# Patient Record
Sex: MALE | Race: WHITE | NOT HISPANIC OR LATINO | URBAN - METROPOLITAN AREA
[De-identification: names, ages, dates, MRNs, and addresses within clinical notes are randomized per-mention and may not be internally consistent; named-entity substitution may affect disease eponyms.]

---

## 2018-06-29 ENCOUNTER — OUTPATIENT (OUTPATIENT)
Dept: OUTPATIENT SERVICES | Facility: HOSPITAL | Age: 48
LOS: 1 days | Discharge: HOME | End: 2018-06-29
Payer: COMMERCIAL

## 2018-07-18 ENCOUNTER — INPATIENT (INPATIENT)
Facility: HOSPITAL | Age: 48
LOS: 8 days | Discharge: HOME | DRG: 675 | End: 2018-07-27
Attending: SPECIALIST | Admitting: SPECIALIST
Payer: COMMERCIAL

## 2018-07-18 VITALS
SYSTOLIC BLOOD PRESSURE: 122 MMHG | TEMPERATURE: 99 F | HEART RATE: 104 BPM | DIASTOLIC BLOOD PRESSURE: 82 MMHG | OXYGEN SATURATION: 98 % | RESPIRATION RATE: 18 BRPM | WEIGHT: 171.08 LBS

## 2018-07-18 DIAGNOSIS — T83.61XA INFECTION AND INFLAMMATORY REACTION DUE TO IMPLANTED PENILE PROSTHESIS, INITIAL ENCOUNTER: ICD-10-CM

## 2018-07-18 LAB
ALBUMIN SERPL ELPH-MCNC: 4.5 G/DL — SIGNIFICANT CHANGE UP (ref 3.3–5)
ALP SERPL-CCNC: 58 U/L — SIGNIFICANT CHANGE UP (ref 40–120)
ALT FLD-CCNC: 12 U/L — SIGNIFICANT CHANGE UP (ref 10–45)
ANION GAP SERPL CALC-SCNC: 13 MMOL/L — SIGNIFICANT CHANGE UP (ref 5–17)
APTT BLD: 34.9 SEC — SIGNIFICANT CHANGE UP (ref 27.5–37.4)
AST SERPL-CCNC: 15 U/L — SIGNIFICANT CHANGE UP (ref 10–40)
BASOPHILS NFR BLD AUTO: 0.4 % — SIGNIFICANT CHANGE UP (ref 0–2)
BILIRUB SERPL-MCNC: 0.3 MG/DL — SIGNIFICANT CHANGE UP (ref 0.2–1.2)
BUN SERPL-MCNC: 12 MG/DL — SIGNIFICANT CHANGE UP (ref 7–23)
CALCIUM SERPL-MCNC: 9.8 MG/DL — SIGNIFICANT CHANGE UP (ref 8.4–10.5)
CHLORIDE SERPL-SCNC: 97 MMOL/L — SIGNIFICANT CHANGE UP (ref 96–108)
CO2 SERPL-SCNC: 27 MMOL/L — SIGNIFICANT CHANGE UP (ref 22–31)
CREAT SERPL-MCNC: 1.1 MG/DL — SIGNIFICANT CHANGE UP (ref 0.5–1.3)
EOSINOPHIL NFR BLD AUTO: 1.3 % — SIGNIFICANT CHANGE UP (ref 0–6)
GLUCOSE SERPL-MCNC: 109 MG/DL — HIGH (ref 70–99)
HCT VFR BLD CALC: 39.2 % — SIGNIFICANT CHANGE UP (ref 39–50)
HGB BLD-MCNC: 13.2 G/DL — SIGNIFICANT CHANGE UP (ref 13–17)
INR BLD: 1.2 — HIGH (ref 0.88–1.16)
LYMPHOCYTES # BLD AUTO: 15.7 % — SIGNIFICANT CHANGE UP (ref 13–44)
MCHC RBC-ENTMCNC: 29.7 PG — SIGNIFICANT CHANGE UP (ref 27–34)
MCHC RBC-ENTMCNC: 33.7 G/DL — SIGNIFICANT CHANGE UP (ref 32–36)
MCV RBC AUTO: 88.1 FL — SIGNIFICANT CHANGE UP (ref 80–100)
MONOCYTES NFR BLD AUTO: 13.3 % — SIGNIFICANT CHANGE UP (ref 2–14)
NEUTROPHILS NFR BLD AUTO: 69.3 % — SIGNIFICANT CHANGE UP (ref 43–77)
PLATELET # BLD AUTO: 495 K/UL — HIGH (ref 150–400)
POTASSIUM SERPL-MCNC: 4 MMOL/L — SIGNIFICANT CHANGE UP (ref 3.5–5.3)
POTASSIUM SERPL-SCNC: 4 MMOL/L — SIGNIFICANT CHANGE UP (ref 3.5–5.3)
PROT SERPL-MCNC: 7.5 G/DL — SIGNIFICANT CHANGE UP (ref 6–8.3)
PROTHROM AB SERPL-ACNC: 13.4 SEC — HIGH (ref 9.8–12.7)
RBC # BLD: 4.45 M/UL — SIGNIFICANT CHANGE UP (ref 4.2–5.8)
RBC # FLD: 13.4 % — SIGNIFICANT CHANGE UP (ref 10.3–16.9)
SODIUM SERPL-SCNC: 137 MMOL/L — SIGNIFICANT CHANGE UP (ref 135–145)
WBC # BLD: 18.1 K/UL — HIGH (ref 3.8–10.5)
WBC # FLD AUTO: 18.1 K/UL — HIGH (ref 3.8–10.5)

## 2018-07-18 PROCEDURE — 99285 EMERGENCY DEPT VISIT HI MDM: CPT

## 2018-07-18 RX ORDER — VANCOMYCIN HCL 1 G
VIAL (EA) INTRAVENOUS
Qty: 0 | Refills: 0 | Status: DISCONTINUED | OUTPATIENT
Start: 2018-07-19 | End: 2018-07-22

## 2018-07-18 RX ORDER — VANCOMYCIN HCL 1 G
1000 VIAL (EA) INTRAVENOUS ONCE
Qty: 0 | Refills: 0 | Status: COMPLETED | OUTPATIENT
Start: 2018-07-18 | End: 2018-07-19

## 2018-07-18 RX ORDER — PIPERACILLIN AND TAZOBACTAM 4; .5 G/20ML; G/20ML
3.38 INJECTION, POWDER, LYOPHILIZED, FOR SOLUTION INTRAVENOUS EVERY 6 HOURS
Qty: 0 | Refills: 0 | Status: DISCONTINUED | OUTPATIENT
Start: 2018-07-18 | End: 2018-07-19

## 2018-07-18 RX ORDER — VANCOMYCIN HCL 1 G
1000 VIAL (EA) INTRAVENOUS EVERY 12 HOURS
Qty: 0 | Refills: 0 | Status: DISCONTINUED | OUTPATIENT
Start: 2018-07-19 | End: 2018-07-22

## 2018-07-18 RX ORDER — LEVOTHYROXINE SODIUM 125 MCG
1 TABLET ORAL
Qty: 0 | Refills: 0 | COMMUNITY

## 2018-07-18 RX ORDER — LEVOTHYROXINE SODIUM 125 MCG
100 TABLET ORAL DAILY
Qty: 0 | Refills: 0 | Status: DISCONTINUED | OUTPATIENT
Start: 2018-07-18 | End: 2018-07-27

## 2018-07-18 NOTE — H&P ADULT - PROBLEM SELECTOR PLAN 1
-Admit to   -ABX  -NPO, IVF  -Add on OR removal, replacement penile prosthesis  -preop labs  -CXR, EKG  -pain meds prn  -oob, amb

## 2018-07-18 NOTE — ED ADULT NURSE NOTE - OBJECTIVE STATEMENT
pt to ER w/ report of penile implant sx on 6/29/18 and redness and swelling to penis today.  Pt denies cp/sob/n/v/f/c.  Breathing unlabored, skin warm and dry. Will continue to monitor. pt to ER w/ report of penile implant sx on 6/29/18 and redness and swelling to scrotum for past two days.  Swelling noted to scrotum, more on L.  Pt denies cp/sob/n/v/f/c.  Breathing unlabored, skin warm and dry. Will continue to monitor.

## 2018-07-18 NOTE — H&P ADULT - NSHPPHYSICALEXAM_GEN_ALL_CORE
Gen: NAD, well appearing  Abd: soft, ntnd  : circumsized phallus partially inflated prothesis balloons. Scrotum erythematous over prothesis pump, Normal right testicule, nontender. Left testicle edematous, mildly tender to palpation. TTP over pump at inferior scrotal region, pump partially immobile. Able to differentiated by palpation of testicles b/l and prosthesis pump.

## 2018-07-18 NOTE — H&P ADULT - HISTORY OF PRESENT ILLNESS
49 y/o male with hx of ED, hypothyroidism presents to the ED c/o scrotal pain with swelling around penile prosthesis pump x2 days. Pt had penile prosthesis placed 6/29. He reports yesterday morning trying to inflate pump during normal inflation cycle. He was able to inflate but had difficulty deflating. After calling Dr. Paredes, pt waited until this morning to try again at which time was still unable to deflate. He also thinks his left testicle is swollen. Denies dyuria, hematuria, fever/chills, suprapubic pain, back pain.

## 2018-07-18 NOTE — ED PROVIDER NOTE - PHYSICAL EXAMINATION
CON: ao x 3, HENMT: clear oropharynx, soft neck, HEAD: atraumatic, CV: rrr, equal pulses b/l, RESP: cta b/l, GI: +BS, soft, nontender, no rebound, no guarding, SKIN: no rash, MSK: no deformities, NEURO: no gross motor or sensory deficit CON: ao x 3, HENMT: clear oropharynx, soft neck, HEAD: atraumatic, CV: rrr, equal pulses b/l, RESP: cta b/l, GI: +BS, soft, nontender, no rebound, no guarding, SKIN: no rash, no perineal erythema or petechiae or gangrene, MSK: no deformities, NEURO: no gross motor or sensory deficit CON: ao x 3, HENMT: clear oropharynx, soft neck, HEAD: atraumatic, CV: rrr, equal pulses b/l, RESP: cta b/l, GI: +BS, soft, nontender, no rebound, no guarding, : swelling to left testicle, nontender, SKIN: no rash, no perineal erythema or petechiae or gangrene, MSK: no deformities, NEURO: no gross motor or sensory deficit

## 2018-07-18 NOTE — H&P ADULT - ASSESSMENT
Pt is a 49 y/o male with hx of ED s/p IPP 6/29, hypothyroidism presenting to the ED c/o scrotal pain with swelling around penile prosthesis pump x2 days. WBC 18.1. No fever.

## 2018-07-18 NOTE — ED ADULT TRIAGE NOTE - CHIEF COMPLAINT QUOTE
pt s/p penile implant surgery 6/29 complaining of redness pain and swelling to penis x 2 days reports doctor told him to come to Ed for infection eval. Pt still urinating without difficulty no blood

## 2018-07-18 NOTE — ED PROVIDER NOTE - PROGRESS NOTE DETAILS
noted leukocytosis, no fever rectally, no tachycardia, does not meet SIRS criteria.  urology team aware of findings and will f/u

## 2018-07-19 DIAGNOSIS — D72.829 ELEVATED WHITE BLOOD CELL COUNT, UNSPECIFIED: ICD-10-CM

## 2018-07-19 DIAGNOSIS — Z01.818 ENCOUNTER FOR OTHER PREPROCEDURAL EXAMINATION: ICD-10-CM

## 2018-07-19 DIAGNOSIS — E03.9 HYPOTHYROIDISM, UNSPECIFIED: ICD-10-CM

## 2018-07-19 LAB
ANION GAP SERPL CALC-SCNC: 11 MMOL/L — SIGNIFICANT CHANGE UP (ref 5–17)
APPEARANCE UR: CLEAR — SIGNIFICANT CHANGE UP
BASOPHILS NFR BLD AUTO: 0.4 % — SIGNIFICANT CHANGE UP (ref 0–2)
BILIRUB UR-MCNC: NEGATIVE — SIGNIFICANT CHANGE UP
BUN SERPL-MCNC: 10 MG/DL — SIGNIFICANT CHANGE UP (ref 7–23)
CALCIUM SERPL-MCNC: 9.9 MG/DL — SIGNIFICANT CHANGE UP (ref 8.4–10.5)
CHLORIDE SERPL-SCNC: 100 MMOL/L — SIGNIFICANT CHANGE UP (ref 96–108)
CO2 SERPL-SCNC: 27 MMOL/L — SIGNIFICANT CHANGE UP (ref 22–31)
COLOR SPEC: YELLOW — SIGNIFICANT CHANGE UP
CREAT SERPL-MCNC: 0.98 MG/DL — SIGNIFICANT CHANGE UP (ref 0.5–1.3)
DIFF PNL FLD: NEGATIVE — SIGNIFICANT CHANGE UP
EOSINOPHIL NFR BLD AUTO: 1.6 % — SIGNIFICANT CHANGE UP (ref 0–6)
GLUCOSE SERPL-MCNC: 123 MG/DL — HIGH (ref 70–99)
GLUCOSE UR QL: NEGATIVE — SIGNIFICANT CHANGE UP
HCT VFR BLD CALC: 40.1 % — SIGNIFICANT CHANGE UP (ref 39–50)
HGB BLD-MCNC: 13 G/DL — SIGNIFICANT CHANGE UP (ref 13–17)
KETONES UR-MCNC: NEGATIVE — SIGNIFICANT CHANGE UP
LEUKOCYTE ESTERASE UR-ACNC: NEGATIVE — SIGNIFICANT CHANGE UP
LYMPHOCYTES # BLD AUTO: 18.1 % — SIGNIFICANT CHANGE UP (ref 13–44)
MAGNESIUM SERPL-MCNC: 2.3 MG/DL — SIGNIFICANT CHANGE UP (ref 1.6–2.6)
MCHC RBC-ENTMCNC: 28.8 PG — SIGNIFICANT CHANGE UP (ref 27–34)
MCHC RBC-ENTMCNC: 32.4 G/DL — SIGNIFICANT CHANGE UP (ref 32–36)
MCV RBC AUTO: 88.9 FL — SIGNIFICANT CHANGE UP (ref 80–100)
MONOCYTES NFR BLD AUTO: 12.7 % — SIGNIFICANT CHANGE UP (ref 2–14)
NEUTROPHILS NFR BLD AUTO: 67.2 % — SIGNIFICANT CHANGE UP (ref 43–77)
NITRITE UR-MCNC: NEGATIVE — SIGNIFICANT CHANGE UP
PH UR: 6.5 — SIGNIFICANT CHANGE UP (ref 5–8)
PHOSPHATE SERPL-MCNC: 3.4 MG/DL — SIGNIFICANT CHANGE UP (ref 2.5–4.5)
PLATELET # BLD AUTO: 465 K/UL — HIGH (ref 150–400)
POTASSIUM SERPL-MCNC: 3.8 MMOL/L — SIGNIFICANT CHANGE UP (ref 3.5–5.3)
POTASSIUM SERPL-SCNC: 3.8 MMOL/L — SIGNIFICANT CHANGE UP (ref 3.5–5.3)
PROT UR-MCNC: NEGATIVE MG/DL — SIGNIFICANT CHANGE UP
RBC # BLD: 4.51 M/UL — SIGNIFICANT CHANGE UP (ref 4.2–5.8)
RBC # FLD: 13.6 % — SIGNIFICANT CHANGE UP (ref 10.3–16.9)
SODIUM SERPL-SCNC: 138 MMOL/L — SIGNIFICANT CHANGE UP (ref 135–145)
SP GR SPEC: 1.01 — SIGNIFICANT CHANGE UP (ref 1–1.03)
UROBILINOGEN FLD QL: 0.2 E.U./DL — SIGNIFICANT CHANGE UP
WBC # BLD: 14 K/UL — HIGH (ref 3.8–10.5)
WBC # FLD AUTO: 14 K/UL — HIGH (ref 3.8–10.5)

## 2018-07-19 PROCEDURE — 99254 IP/OBS CNSLTJ NEW/EST MOD 60: CPT | Mod: GC

## 2018-07-19 RX ORDER — OXYCODONE AND ACETAMINOPHEN 5; 325 MG/1; MG/1
2 TABLET ORAL EVERY 6 HOURS
Qty: 0 | Refills: 0 | Status: DISCONTINUED | OUTPATIENT
Start: 2018-07-19 | End: 2018-07-22

## 2018-07-19 RX ORDER — SENNA PLUS 8.6 MG/1
1 TABLET ORAL AT BEDTIME
Qty: 0 | Refills: 0 | Status: DISCONTINUED | OUTPATIENT
Start: 2018-07-19 | End: 2018-07-19

## 2018-07-19 RX ORDER — SENNA PLUS 8.6 MG/1
1 TABLET ORAL AT BEDTIME
Qty: 0 | Refills: 0 | Status: DISCONTINUED | OUTPATIENT
Start: 2018-07-19 | End: 2018-07-27

## 2018-07-19 RX ORDER — DOCUSATE SODIUM 100 MG
100 CAPSULE ORAL DAILY
Qty: 0 | Refills: 0 | Status: DISCONTINUED | OUTPATIENT
Start: 2018-07-19 | End: 2018-07-22

## 2018-07-19 RX ORDER — CIPROFLOXACIN LACTATE 400MG/40ML
400 VIAL (ML) INTRAVENOUS EVERY 12 HOURS
Qty: 0 | Refills: 0 | Status: DISCONTINUED | OUTPATIENT
Start: 2018-07-19 | End: 2018-07-19

## 2018-07-19 RX ORDER — DOCUSATE SODIUM 100 MG
100 CAPSULE ORAL DAILY
Qty: 0 | Refills: 0 | Status: DISCONTINUED | OUTPATIENT
Start: 2018-07-19 | End: 2018-07-19

## 2018-07-19 RX ORDER — CEFEPIME 1 G/1
2000 INJECTION, POWDER, FOR SOLUTION INTRAMUSCULAR; INTRAVENOUS ONCE
Qty: 0 | Refills: 0 | Status: COMPLETED | OUTPATIENT
Start: 2018-07-19 | End: 2018-07-19

## 2018-07-19 RX ORDER — CEFEPIME 1 G/1
2000 INJECTION, POWDER, FOR SOLUTION INTRAMUSCULAR; INTRAVENOUS EVERY 12 HOURS
Qty: 0 | Refills: 0 | Status: DISCONTINUED | OUTPATIENT
Start: 2018-07-19 | End: 2018-07-27

## 2018-07-19 RX ORDER — POLYETHYLENE GLYCOL 3350 17 G/17G
17 POWDER, FOR SOLUTION ORAL ONCE
Qty: 0 | Refills: 0 | Status: COMPLETED | OUTPATIENT
Start: 2018-07-19 | End: 2018-07-22

## 2018-07-19 RX ORDER — ACETAMINOPHEN 500 MG
650 TABLET ORAL EVERY 6 HOURS
Qty: 0 | Refills: 0 | Status: DISCONTINUED | OUTPATIENT
Start: 2018-07-19 | End: 2018-07-27

## 2018-07-19 RX ORDER — SODIUM CHLORIDE 9 MG/ML
1000 INJECTION INTRAMUSCULAR; INTRAVENOUS; SUBCUTANEOUS
Qty: 0 | Refills: 0 | Status: DISCONTINUED | OUTPATIENT
Start: 2018-07-19 | End: 2018-07-22

## 2018-07-19 RX ORDER — OXYCODONE AND ACETAMINOPHEN 5; 325 MG/1; MG/1
1 TABLET ORAL EVERY 4 HOURS
Qty: 0 | Refills: 0 | Status: DISCONTINUED | OUTPATIENT
Start: 2018-07-19 | End: 2018-07-19

## 2018-07-19 RX ORDER — ONDANSETRON 8 MG/1
4 TABLET, FILM COATED ORAL EVERY 6 HOURS
Qty: 0 | Refills: 0 | Status: DISCONTINUED | OUTPATIENT
Start: 2018-07-19 | End: 2018-07-27

## 2018-07-19 RX ORDER — CEFEPIME 1 G/1
INJECTION, POWDER, FOR SOLUTION INTRAMUSCULAR; INTRAVENOUS
Qty: 0 | Refills: 0 | Status: DISCONTINUED | OUTPATIENT
Start: 2018-07-19 | End: 2018-07-19

## 2018-07-19 RX ADMIN — SENNA PLUS 1 TABLET(S): 8.6 TABLET ORAL at 22:24

## 2018-07-19 RX ADMIN — Medication 100 MILLIGRAM(S): at 22:24

## 2018-07-19 RX ADMIN — CEFEPIME 100 MILLIGRAM(S): 1 INJECTION, POWDER, FOR SOLUTION INTRAMUSCULAR; INTRAVENOUS at 10:25

## 2018-07-19 RX ADMIN — SODIUM CHLORIDE 125 MILLILITER(S): 9 INJECTION INTRAMUSCULAR; INTRAVENOUS; SUBCUTANEOUS at 01:44

## 2018-07-19 RX ADMIN — Medication 100 MICROGRAM(S): at 05:41

## 2018-07-19 RX ADMIN — CEFEPIME 100 MILLIGRAM(S): 1 INJECTION, POWDER, FOR SOLUTION INTRAMUSCULAR; INTRAVENOUS at 22:23

## 2018-07-19 RX ADMIN — Medication 250 MILLIGRAM(S): at 00:25

## 2018-07-19 RX ADMIN — Medication 250 MILLIGRAM(S): at 11:00

## 2018-07-19 RX ADMIN — Medication 200 MILLIGRAM(S): at 05:41

## 2018-07-19 NOTE — PROGRESS NOTE ADULT - SUBJECTIVE AND OBJECTIVE BOX
AM Note    No acute events overnight.      Vital Signs Last 24 Hrs  T(C): 36.5 (07-19-18 @ 05:40), Max: 37.2 (07-18-18 @ 23:28)  T(F): 97.7 (07-19-18 @ 05:40), Max: 98.9 (07-18-18 @ 23:28)  HR: 63 (07-19-18 @ 05:40) (63 - 104)  BP: 96/63 (07-19-18 @ 05:40) (96/63 - 122/82)  BP(mean): --  RR: 16 (07-19-18 @ 05:40) (16 - 18)  SpO2: 99% (07-19-18 @ 05:40) (98% - 100%)     18 Jul 2018 22:38    137    |  97     |  12     ----------------------------<  109    4.0     |  27     |  1.10     Ca    9.8        18 Jul 2018 22:38    TPro  7.5    /  Alb  4.5    /  TBili  0.3    /  DBili  x      /  AST  15     /  ALT  12     /  AlkPhos  58     18 Jul 2018 22:38                          13.2   18.1  )-----------( 495      ( 18 Jul 2018 22:38 )             39.2         I&O's Summary    18 Jul 2018 07:01  -  19 Jul 2018 06:09  --------------------------------------------------------  IN: 700 mL / OUT: 780 mL / NET: -80 mL          PHYSICAL EXAM:    GEN: NAD  ABD: soft, ntnd  : voiding without difficult, scrotum mildly edematous erythematous over inferior left testicle and prosthesis pump

## 2018-07-19 NOTE — CONSULT NOTE ADULT - ASSESSMENT
48M w/ hypothyroidism, ED s/p penile prosthesis presents w/ prosthesis infection, planned for removal/revision.

## 2018-07-19 NOTE — CONSULT NOTE ADULT - PROBLEM SELECTOR RECOMMENDATION 9
RCRI:  METS: Planned for penile prosthesis revision/removal.  On pre-op abx.  No history of cardiac disease, DM, or CVA.  Physically active.  Low risk procedure.  RCRI: Class I  METS: >4  - Low risk for low risk procedure  - C/w levothyroxine  - Patient medically optimized

## 2018-07-19 NOTE — CONSULT NOTE ADULT - ATTENDING COMMENTS
Pre-op for penile prosthesis removal.  Combined clinical and surgical risk is low. Low risk procedure.  Medically optimized for planned procedure. EKG not indicated.  Rest as above.  Thanks for the consult and will continue to follow up.

## 2018-07-19 NOTE — CONSULT NOTE ADULT - SUBJECTIVE AND OBJECTIVE BOX
Patient is a 48y old  Male who presents with a chief complaint of Infected penile prosthesis (2018 22:49)    INTERVAL HPI/OVERNIGHT EVENTS:  48M      REVIEW OF SYSTEMS:  CONSTITUTIONAL: No fevers or chills  EYES/ENT: No visual or hearing changes; no throat pain or difficulty swallowing  NECK: No pain or stiffness  RESPIRATORY: No cough, wheezing; No shortness of breath  CARDIOVASCULAR: No chest pain or palpitations  GASTROINTESTINAL: No abdominal pain. No nausea, vomiting; No diarrhea or constipation.  GENITOURINARY: No dysuria, frequency or hematuria  NEUROLOGICAL: No numbness or weakness  SKIN: No itching, burning, rashes, or lesions   All other review of systems is negative unless indicated above.    PAST MEDICAL & SURGICAL HISTORY:  Hypothyroid    FAMILY HISTORY:  No pertinent family history in first degree relatives    SOCIAL HISTORY:    Home Medications:  Synthroid 100 mcg (0.1 mg) oral tablet: 1 tab(s) orally once a day (2018 23:09)    Allergies    penicillin (Rash)    Intolerances    VITALS  Vital Signs Last 24 Hrs  T(C): 36.7 (2018 08:21), Max: 37.2 (2018 23:28)  T(F): 98 (2018 08:21), Max: 98.9 (2018 23:28)  HR: 65 (2018 08:21) (63 - 104)  BP: 102/68 (2018 08:21) (96/63 - 122/82)  BP(mean): --  RR: 17 (2018 08:21) (16 - 18)  SpO2: 95% (2018 08:21) (95% - 100%)    I&O's Summary    2018 07:  -  2018 07:00  --------------------------------------------------------  IN: 825 mL / OUT: 780 mL / NET: 45 mL    2018 07:  -  2018 09:13  --------------------------------------------------------  IN: 0 mL / OUT: 400 mL / NET: -400 mL      PHYSICAL EXAM  General: NAD; comfortable  HEENT: NC/AT, supple neck, MMM, no JVD  Respiratory: CTA B/L; no wheezes/crackles w/ good air movement  Cardiovascular: Regular rhythm/rate; S1/S2  Gastrointestinal: Soft; NTND; bowel sounds normal  Extremities: WWP; no edema; no clubbing; radial/pedal pulses palpable  Neurological:  A&Ox3; PERRL; EOMI; CNII-XII grossly intact; 5/5 strength; sensation intact; reflexes 2+; no obvious focal deficits  Skin: No rashes or ulcers, normal tugor    Consultant(s) Notes Reviewed:  [x ] YES  [ ] NO  Care Discussed with Consultants/Other Providers [ x] YES  [ ] NO    LABS:                        13.0   14.0  )-----------( 465      ( 2018 07:10 )             40.1     07-    138  |  100  |  10  ----------------------------<  123<H>  3.8   |  27  |  0.98    Ca    9.9      2018 06:29  Phos  3.4     -  Mg     2.3     -    TPro  7.5  /  Alb  4.5  /  TBili  0.3  /  DBili  x   /  AST  15  /  ALT  12  /  AlkPhos  58  07-18    PT/INR - ( 2018 22:38 )   PT: 13.4 sec;   INR: 1.20          PTT - ( 2018 22:38 )  PTT:34.9 sec  Urinalysis Basic - ( 2018 01:34 )    Color: Yellow / Appearance: Clear / S.010 / pH: x  Gluc: x / Ketone: NEGATIVE  / Bili: Negative / Urobili: 0.2 E.U./dL   Blood: x / Protein: NEGATIVE mg/dL / Nitrite: NEGATIVE   Leuk Esterase: NEGATIVE / RBC: x / WBC x   Sq Epi: x / Non Sq Epi: x / Bacteria: x    Imaging Personally Reviewed:  [X] YES  [ ] NO Patient is a 48y old  Male who presents with a chief complaint of Infected penile prosthesis (2018 22:49)    INTERVAL HPI/OVERNIGHT EVENTS:  48M w/ hypothyroidism and ED (s/p penile prosthesis on ) presents w/ scrotal and penile swelling, erythema, and tenderness.  He noticed the symptoms 2-3 days ago.  No associated fever/chills, n/v.  Pain located at scrotum, w/ discomfort (not burning) when urinating.  No radiation of the pain.      He was dx w/ hypothyroid 5 years ago.  Ultrasounds and biopsies were unremarkable.  He has had a stable dose of levothyroxine (100mcg).  He last had his TSH checking about 1.5 years ago.  Saw his PMD a few weeks ago for medical clearance and was told everything was good.  No hx of CAD/DM/CHF/CVA.  Physically active w/ kids, no SOB or CP when active.  Lives w/ wife and kids.  Received spinal block for previous surgery but has otherwise not been under general anesthesia.      REVIEW OF SYSTEMS:  CONSTITUTIONAL: No fevers or chills  EYES/ENT: No visual or hearing changes; no throat pain or difficulty swallowing  NECK: No pain or stiffness  RESPIRATORY: No cough, wheezing; No shortness of breath  CARDIOVASCULAR: No chest pain or palpitations  GASTROINTESTINAL: No abdominal pain. No nausea, vomiting; No diarrhea or constipation.  GENITOURINARY: As per HPI  NEUROLOGICAL: No numbness or weakness  SKIN: No itching, burning, rashes, or lesions   All other review of systems is negative unless indicated above.    PAST MEDICAL & SURGICAL HISTORY:  Hypothyroid    FAMILY HISTORY:  Mother- HTN    SOCIAL HISTORY:  Active smoker; no EtOH; no illicit drugs.  Lives w/ wife    Home Medications:  Synthroid 100 mcg (0.1 mg) oral tablet: 1 tab(s) orally once a day (2018 23:09)    Allergies    penicillin (Rash)    Intolerances    VITALS  Vital Signs Last 24 Hrs  T(C): 36.7 (2018 08:21), Max: 37.2 (2018 23:28)  T(F): 98 (2018 08:21), Max: 98.9 (2018 23:28)  HR: 65 (2018 08:21) (63 - 104)  BP: 102/68 (2018 08:21) (96/63 - 122/82)  BP(mean): --  RR: 17 (2018 08:21) (16 - 18)  SpO2: 95% (2018 08:21) (95% - 100%)    I&O's Summary    2018 07:01  -  2018 07:00  --------------------------------------------------------  IN: 825 mL / OUT: 780 mL / NET: 45 mL    2018 07:01  -  2018 09:13  --------------------------------------------------------  IN: 0 mL / OUT: 400 mL / NET: -400 mL      PHYSICAL EXAM  General: NAD; comfortable  HEENT: NC/AT, supple neck, MMM, no JVD  Respiratory: CTA B/L; no wheezes/crackles w/ good air movement  Cardiovascular: Regular rhythm/rate; S1/S2  Gastrointestinal: Soft; NTND; bowel sounds normal  Extremities: WWP; no edema; no clubbing; radial/pedal pulses palpable  Neurological:  A&Ox3; PERRL; EOMI; CNII-XII grossly intact; 5/5 strength; sensation intact; reflexes 2+; no obvious focal deficits  Skin: No rashes or ulcers, normal tugor    Consultant(s) Notes Reviewed:  [x ] YES  [ ] NO  Care Discussed with Consultants/Other Providers [ x] YES  [ ] NO    LABS:                        13.0   14.0  )-----------( 465      ( 2018 07:10 )             40.1     07-    138  |  100  |  10  ----------------------------<  123<H>  3.8   |  27  |  0.98    Ca    9.9      2018 06:29  Phos  3.4     -  Mg     2.3         TPro  7.5  /  Alb  4.5  /  TBili  0.3  /  DBili  x   /  AST  15  /  ALT  12  /  AlkPhos  58  07-18    PT/INR - ( 2018 22:38 )   PT: 13.4 sec;   INR: 1.20        PTT - ( 2018 22:38 )  PTT:34.9 sec  Urinalysis Basic - ( 2018 01:34 )    Color: Yellow / Appearance: Clear / S.010 / pH: x  Gluc: x / Ketone: NEGATIVE  / Bili: Negative / Urobili: 0.2 E.U./dL   Blood: x / Protein: NEGATIVE mg/dL / Nitrite: NEGATIVE   Leuk Esterase: NEGATIVE / RBC: x / WBC x   Sq Epi: x / Non Sq Epi: x / Bacteria: x    Imaging Personally Reviewed:  [X] YES  [ ] NO

## 2018-07-19 NOTE — CONSULT NOTE ADULT - PROBLEM SELECTOR RECOMMENDATION 3
Likely 2/2 infected penile prosthesis.  Does not meet SIRS/Sepsis criteria. Likely 2/2 infected penile prosthesis.  Does not meet SIRS/Sepsis criteria.  - C/w abx as per above

## 2018-07-19 NOTE — CONSULT NOTE ADULT - PROBLEM SELECTOR RECOMMENDATION 2
W/ penicillin allergy.    - C/w broad spectrum antibiotics. W/ penicillin allergy.    - Given foreign body, will need pseudomonal and MRSA coverage.  - C/w cefepime (tolerated keflex previously, low risk for cross-reactivity to penicillin) and vancomycin  - F/u cultures when avail

## 2018-07-20 LAB
ANION GAP SERPL CALC-SCNC: 13 MMOL/L — SIGNIFICANT CHANGE UP (ref 5–17)
BASOPHILS NFR BLD AUTO: 0.6 % — SIGNIFICANT CHANGE UP (ref 0–2)
BUN SERPL-MCNC: 12 MG/DL — SIGNIFICANT CHANGE UP (ref 7–23)
CALCIUM SERPL-MCNC: 9.6 MG/DL — SIGNIFICANT CHANGE UP (ref 8.4–10.5)
CHLORIDE SERPL-SCNC: 102 MMOL/L — SIGNIFICANT CHANGE UP (ref 96–108)
CO2 SERPL-SCNC: 26 MMOL/L — SIGNIFICANT CHANGE UP (ref 22–31)
CREAT SERPL-MCNC: 0.94 MG/DL — SIGNIFICANT CHANGE UP (ref 0.5–1.3)
CULTURE RESULTS: NO GROWTH — SIGNIFICANT CHANGE UP
EOSINOPHIL NFR BLD AUTO: 2.5 % — SIGNIFICANT CHANGE UP (ref 0–6)
GLUCOSE SERPL-MCNC: 124 MG/DL — HIGH (ref 70–99)
GRAM STN FLD: SIGNIFICANT CHANGE UP
HCT VFR BLD CALC: 40.1 % — SIGNIFICANT CHANGE UP (ref 39–50)
HGB BLD-MCNC: 13.2 G/DL — SIGNIFICANT CHANGE UP (ref 13–17)
LYMPHOCYTES # BLD AUTO: 26.7 % — SIGNIFICANT CHANGE UP (ref 13–44)
MAGNESIUM SERPL-MCNC: 2.2 MG/DL — SIGNIFICANT CHANGE UP (ref 1.6–2.6)
MCHC RBC-ENTMCNC: 29.8 PG — SIGNIFICANT CHANGE UP (ref 27–34)
MCHC RBC-ENTMCNC: 32.9 G/DL — SIGNIFICANT CHANGE UP (ref 32–36)
MCV RBC AUTO: 90.5 FL — SIGNIFICANT CHANGE UP (ref 80–100)
MONOCYTES NFR BLD AUTO: 11.2 % — SIGNIFICANT CHANGE UP (ref 2–14)
NEUTROPHILS NFR BLD AUTO: 59 % — SIGNIFICANT CHANGE UP (ref 43–77)
PHOSPHATE SERPL-MCNC: 3.2 MG/DL — SIGNIFICANT CHANGE UP (ref 2.5–4.5)
PLATELET # BLD AUTO: 454 K/UL — HIGH (ref 150–400)
POTASSIUM SERPL-MCNC: 3.9 MMOL/L — SIGNIFICANT CHANGE UP (ref 3.5–5.3)
POTASSIUM SERPL-SCNC: 3.9 MMOL/L — SIGNIFICANT CHANGE UP (ref 3.5–5.3)
RBC # BLD: 4.43 M/UL — SIGNIFICANT CHANGE UP (ref 4.2–5.8)
RBC # FLD: 13.6 % — SIGNIFICANT CHANGE UP (ref 10.3–16.9)
SODIUM SERPL-SCNC: 141 MMOL/L — SIGNIFICANT CHANGE UP (ref 135–145)
SPECIMEN SOURCE: SIGNIFICANT CHANGE UP
VANCOMYCIN TROUGH SERPL-MCNC: 13 UG/ML — SIGNIFICANT CHANGE UP (ref 10–20)
WBC # BLD: 11.4 K/UL — HIGH (ref 3.8–10.5)
WBC # FLD AUTO: 11.4 K/UL — HIGH (ref 3.8–10.5)

## 2018-07-20 RX ORDER — NALOXONE HYDROCHLORIDE 4 MG/.1ML
0.1 SPRAY NASAL
Qty: 0 | Refills: 0 | Status: DISCONTINUED | OUTPATIENT
Start: 2018-07-20 | End: 2018-07-27

## 2018-07-20 RX ORDER — HYDROMORPHONE HYDROCHLORIDE 2 MG/ML
0.5 INJECTION INTRAMUSCULAR; INTRAVENOUS; SUBCUTANEOUS
Qty: 0 | Refills: 0 | Status: DISCONTINUED | OUTPATIENT
Start: 2018-07-20 | End: 2018-07-22

## 2018-07-20 RX ORDER — HYDROMORPHONE HYDROCHLORIDE 2 MG/ML
30 INJECTION INTRAMUSCULAR; INTRAVENOUS; SUBCUTANEOUS
Qty: 0 | Refills: 0 | Status: DISCONTINUED | OUTPATIENT
Start: 2018-07-20 | End: 2018-07-22

## 2018-07-20 RX ORDER — NICOTINE POLACRILEX 2 MG
4 GUM BUCCAL EVERY 4 HOURS
Qty: 0 | Refills: 0 | Status: DISCONTINUED | OUTPATIENT
Start: 2018-07-20 | End: 2018-07-27

## 2018-07-20 RX ADMIN — Medication 250 MILLIGRAM(S): at 00:14

## 2018-07-20 RX ADMIN — HYDROMORPHONE HYDROCHLORIDE 30 MILLILITER(S): 2 INJECTION INTRAMUSCULAR; INTRAVENOUS; SUBCUTANEOUS at 12:10

## 2018-07-20 RX ADMIN — Medication 250 MILLIGRAM(S): at 21:17

## 2018-07-20 RX ADMIN — CEFEPIME 100 MILLIGRAM(S): 1 INJECTION, POWDER, FOR SOLUTION INTRAMUSCULAR; INTRAVENOUS at 22:45

## 2018-07-20 RX ADMIN — SENNA PLUS 1 TABLET(S): 8.6 TABLET ORAL at 21:17

## 2018-07-20 RX ADMIN — Medication 100 MICROGRAM(S): at 06:03

## 2018-07-20 RX ADMIN — Medication 100 MILLIGRAM(S): at 21:39

## 2018-07-20 RX ADMIN — SODIUM CHLORIDE 125 MILLILITER(S): 9 INJECTION INTRAMUSCULAR; INTRAVENOUS; SUBCUTANEOUS at 11:34

## 2018-07-20 NOTE — PROGRESS NOTE ADULT - PROBLEM SELECTOR PLAN 1
Planned for penile prosthesis revision/removal today.  On broad spectrum abx.  No history of cardiac disease, DM, or CVA.  Physically active.  Low risk procedure.  RCRI: Class I  METS: >4  - Low risk for low risk procedure  - C/w levothyroxine  - Patient medically optimized.

## 2018-07-20 NOTE — PROGRESS NOTE ADULT - SUBJECTIVE AND OBJECTIVE BOX
POC    Patient is a 48y old  Male who presents with a chief complaint of Infected penile prosthesis (18 Jul 2018 22:49) s/p removal of implant and injection of Stimulan      Pt denies severe pain, CP, SOB, n/v      Vital Signs Last 24 Hrs  T(C): 36 (20 Jul 2018 11:00), Max: 37.1 (19 Jul 2018 20:37)  T(F): 96.8 (20 Jul 2018 11:00), Max: 98.7 (19 Jul 2018 20:37)  HR: 62 (20 Jul 2018 13:00) (56 - 64)  BP: 124/66 (20 Jul 2018 13:00) (101/68 - 124/66)  BP(mean): 90 (20 Jul 2018 13:00) (81 - 91)  RR: 10 (20 Jul 2018 13:00) (10 - 18)  SpO2: 97% (20 Jul 2018 13:00) (95% - 100%)    I&O's Summary    19 Jul 2018 07:01  -  20 Jul 2018 07:00  --------------------------------------------------------  IN: 1725 mL / OUT: 2650 mL / NET: -925 mL    20 Jul 2018 07:01  -  20 Jul 2018 13:40  --------------------------------------------------------  IN: 375 mL / OUT: 600 mL / NET: -225 mL        Gen: NAD    Abd: soft, NTND, no guarding    : FC taped to abd, intact and draining clear, yellow urine, +scrotal support and fluff CDI                          13.2   11.4  )-----------( 454      ( 20 Jul 2018 06:37 )             40.1     07-20    141  |  102  |  12  ----------------------------<  124<H>  3.9   |  26  |  0.94    Ca    9.6      20 Jul 2018 06:37  Phos  3.2     07-20  Mg     2.2     07-20    TPro  7.5  /  Alb  4.5  /  TBili  0.3  /  DBili  x   /  AST  15  /  ALT  12  /  AlkPhos  58  07-18    culturesCulture Results:   No growth (07-19 @ 09:33)      A/P  48M with infected PP now s/p explantation   -cont abx (VT at 7pm)  -dvt ppx (SCDs)  -PCA ordered  -diet: reg  -OOB tmrw  -IS  -f/u PCN test and ID recs

## 2018-07-20 NOTE — PROGRESS NOTE ADULT - SUBJECTIVE AND OBJECTIVE BOX
Internal Medicine Consult Progress Note    O/N Events:  Subjective/ROS: Denies HA, CP, SOB, n/v, changes in bowel/urinary habits.  12pt ROS otherwise negative.    VITALS  Vital Signs Last 24 Hrs  T(C): 37 (2018 05:33), Max: 37.1 (2018 20:37)  T(F): 98.6 (2018 05:33), Max: 98.7 (2018 20:37)  HR: 62 (2018 05:33) (60 - 70)  BP: 101/68 (2018 05:33) (101/68 - 114/77)  BP(mean): --  RR: 16 (2018 05:33) (16 - 18)  SpO2: 95% (2018 05:33) (95% - 100%)    CAPILLARY BLOOD GLUCOSE    PHYSICAL EXAM  General: NAD; comfortable  HEENT: NC/AT, supple neck, MMM, no JVD  Respiratory: CTA B/L; no wheezes/crackles w/ good air movement  Cardiovascular: Regular rhythm/rate; S1/S2  Gastrointestinal: Soft; NTND; bowel sounds normal  Extremities: WWP; no edema; no clubbing; radial/pedal pulses palpable  Neurological:  A&Ox3; PERRL; EOMI; CNII-XII grossly intact; 5/5 strength; sensation intact; reflexes 2+; no obvious focal deficits  Skin: No rashes or ulcers, normal tugor    MEDICATIONS  (STANDING):  cefepime   IVPB 2000 milliGRAM(s) IV Intermittent every 12 hours  levothyroxine 100 MICROGram(s) Oral daily  senna 1 Tablet(s) Oral at bedtime  sodium chloride 0.9%. 1000 milliLiter(s) (125 mL/Hr) IV Continuous <Continuous>  vancomycin  IVPB 1000 milliGRAM(s) IV Intermittent every 12 hours  vancomycin  IVPB        MEDICATIONS  (PRN):  acetaminophen   Tablet 650 milliGRAM(s) Oral every 6 hours PRN For Temp greater than 38 C (100.4 F)  acetaminophen   Tablet. 650 milliGRAM(s) Oral every 6 hours PRN Mild Pain (1 - 3)  docusate sodium 100 milliGRAM(s) Oral daily PRN Constipation  ondansetron Injectable 4 milliGRAM(s) IV Push every 6 hours PRN Nausea and/or Vomiting  oxyCODONE    5 mG/acetaminophen 325 mG 1 Tablet(s) Oral every 4 hours PRN Moderate Pain (4 - 6)  oxyCODONE    5 mG/acetaminophen 325 mG 2 Tablet(s) Oral every 6 hours PRN Severe Pain (7 - 10)  polyethylene glycol 3350 17 Gram(s) Oral once PRN Constipation    penicillin (Rash)    LABS                        13.2   11.4  )-----------( 454      ( 2018 06:37 )             40.1     07-    141  |  102  |  12  ----------------------------<  124<H>  3.9   |  26  |  0.94    Ca    9.6      2018 06:37  Phos  3.2     -  Mg     2.2         TPro  7.5  /  Alb  4.5  /  TBili  0.3  /  DBili  x   /  AST  15  /  ALT  12  /  AlkPhos  58  07-18    PT/INR - ( 2018 22:38 )   PT: 13.4 sec;   INR: 1.20          PTT - ( 2018 22:38 )  PTT:34.9 sec  Urinalysis Basic - ( 2018 01:34 )    Color: Yellow / Appearance: Clear / S.010 / pH: x  Gluc: x / Ketone: NEGATIVE  / Bili: Negative / Urobili: 0.2 E.U./dL   Blood: x / Protein: NEGATIVE mg/dL / Nitrite: NEGATIVE   Leuk Esterase: NEGATIVE / RBC: x / WBC x   Sq Epi: x / Non Sq Epi: x / Bacteria: x            IMAGING/EKG/ETC  EKG:  Xray:  CT:  MRI: Internal Medicine Consult Progress Note    O/N Events: ELE  Subjective/ROS: Patient at OR.    Denies HA, CP, SOB, n/v, changes in bowel/urinary habits.  12pt ROS otherwise negative.    VITALS  Vital Signs Last 24 Hrs  T(C): 37 (2018 05:33), Max: 37.1 (2018 20:37)  T(F): 98.6 (2018 05:33), Max: 98.7 (2018 20:37)  HR: 62 (2018 05:33) (60 - 70)  BP: 101/68 (2018 05:33) (101/68 - 114/77)  BP(mean): --  RR: 16 (2018 05:33) (16 - 18)  SpO2: 95% (2018 05:33) (95% - 100%)    CAPILLARY BLOOD GLUCOSE    PHYSICAL EXAM  General: NAD; comfortable  HEENT: NC/AT, supple neck, MMM, no JVD  Respiratory: CTA B/L; no wheezes/crackles w/ good air movement  Cardiovascular: Regular rhythm/rate; S1/S2  Gastrointestinal: Soft; NTND; bowel sounds normal  Extremities: WWP; no edema; no clubbing; radial/pedal pulses palpable  Neurological:  A&Ox3; PERRL; EOMI; CNII-XII grossly intact; 5/5 strength; sensation intact; reflexes 2+; no obvious focal deficits  Skin: No rashes or ulcers, normal tugor    MEDICATIONS  (STANDING):  cefepime   IVPB 2000 milliGRAM(s) IV Intermittent every 12 hours  levothyroxine 100 MICROGram(s) Oral daily  senna 1 Tablet(s) Oral at bedtime  sodium chloride 0.9%. 1000 milliLiter(s) (125 mL/Hr) IV Continuous <Continuous>  vancomycin  IVPB 1000 milliGRAM(s) IV Intermittent every 12 hours  vancomycin  IVPB        MEDICATIONS  (PRN):  acetaminophen   Tablet 650 milliGRAM(s) Oral every 6 hours PRN For Temp greater than 38 C (100.4 F)  acetaminophen   Tablet. 650 milliGRAM(s) Oral every 6 hours PRN Mild Pain (1 - 3)  docusate sodium 100 milliGRAM(s) Oral daily PRN Constipation  ondansetron Injectable 4 milliGRAM(s) IV Push every 6 hours PRN Nausea and/or Vomiting  oxyCODONE    5 mG/acetaminophen 325 mG 1 Tablet(s) Oral every 4 hours PRN Moderate Pain (4 - 6)  oxyCODONE    5 mG/acetaminophen 325 mG 2 Tablet(s) Oral every 6 hours PRN Severe Pain (7 - 10)  polyethylene glycol 3350 17 Gram(s) Oral once PRN Constipation    penicillin (Rash)    LABS                        13.2   11.4  )-----------( 454      ( 2018 06:37 )             40.1     07-20    141  |  102  |  12  ----------------------------<  124<H>  3.9   |  26  |  0.94    Ca    9.6      2018 06:37  Phos  3.2     -  Mg     2.2         TPro  7.5  /  Alb  4.5  /  TBili  0.3  /  DBili  x   /  AST  15  /  ALT  12  /  AlkPhos  58  07-18    PT/INR - ( 2018 22:38 )   PT: 13.4 sec;   INR: 1.20          PTT - ( 2018 22:38 )  PTT:34.9 sec  Urinalysis Basic - ( 2018 01:34 )    Color: Yellow / Appearance: Clear / S.010 / pH: x  Gluc: x / Ketone: NEGATIVE  / Bili: Negative / Urobili: 0.2 E.U./dL   Blood: x / Protein: NEGATIVE mg/dL / Nitrite: NEGATIVE   Leuk Esterase: NEGATIVE / RBC: x / WBC x   Sq Epi: x / Non Sq Epi: x / Bacteria: x    IMAGING/EKG/ETC: Personally reviewed

## 2018-07-20 NOTE — PROGRESS NOTE ADULT - SUBJECTIVE AND OBJECTIVE BOX
INTERVAL HPI/OVERNIGHT EVENTS:  No acute events overnight.    VITALS:    T(F): 98.6 (18 @ 05:33), Max: 98.7 (18 @ 20:37)  HR: 62 (18 @ 05:33) (60 - 70)  BP: 101/68 (18 @ 05:33) (101/68 - 114/77)  RR: 16 (18 @ 05:33) (16 - 18)  SpO2: 95% (18 @ 05:33) (95% - 100%)  Wt(kg): --    I&O's Detail    2018 07:01  -  2018 07:00  --------------------------------------------------------  IN:    IV PiggyBack: 200 mL    sodium chloride 0.9%.: 625 mL  Total IN: 825 mL    OUT:    Voided: 780 mL  Total OUT: 780 mL    Total NET: 45 mL      2018 07:01  -  2018 05:48  --------------------------------------------------------  IN:    IV PiggyBack: 250 mL    Oral Fluid: 350 mL    sodium chloride 0.9%.: 1125 mL  Total IN: 1725 mL    OUT:    Voided: 2650 mL  Total OUT: 2650 mL    Total NET: -925 mL          MEDICATIONS:    ANTIBIOTICS:  cefepime   IVPB 2000 milliGRAM(s) IV Intermittent every 12 hours  vancomycin  IVPB 1000 milliGRAM(s) IV Intermittent every 12 hours  vancomycin  IVPB          PAIN CONTROL:  acetaminophen   Tablet 650 milliGRAM(s) Oral every 6 hours PRN  acetaminophen   Tablet. 650 milliGRAM(s) Oral every 6 hours PRN  ondansetron Injectable 4 milliGRAM(s) IV Push every 6 hours PRN  oxyCODONE    5 mG/acetaminophen 325 mG 1 Tablet(s) Oral every 4 hours PRN  oxyCODONE    5 mG/acetaminophen 325 mG 2 Tablet(s) Oral every 6 hours PRN       MEDS:      HEME/ONC        PHYSICAL EXAM:  General: No acute distress.  Alert and Oriented  Abdominal Exam: soft nt/nd.   Exam: Patient voiding.  Tender to palpation to scrotal area, w/ erythema and edema.       LABS:                        13.0   14.0  )-----------( 465      ( 2018 07:10 )             40.1     07-    138  |  100  |  10  ----------------------------<  123<H>  3.8   |  27  |  0.98    Ca    9.9      2018 06:29  Phos  3.4     -  Mg     2.3     -    TPro  7.5  /  Alb  4.5  /  TBili  0.3  /  DBili  x   /  AST  15  /  ALT  12  /  AlkPhos  58  07-18    PT/INR - ( 2018 22:38 )   PT: 13.4 sec;   INR: 1.20          PTT - ( 2018 22:38 )  PTT:34.9 sec  Urinalysis Basic - ( 2018 01:34 )    Color: Yellow / Appearance: Clear / S.010 / pH: x  Gluc: x / Ketone: NEGATIVE  / Bili: Negative / Urobili: 0.2 E.U./dL   Blood: x / Protein: NEGATIVE mg/dL / Nitrite: NEGATIVE   Leuk Esterase: NEGATIVE / RBC: x / WBC x   Sq Epi: x / Non Sq Epi: x / Bacteria: x        RADIOLOGY & ADDITIONAL TESTS:    ASSESSMENT: 48yMale w/ infected penile prosthesis placed on .  Patient is HDS, VSS.     PLAN:  -Diet: NPO mdnt  -Pain control  -Monitor Urine Output  -DVT ppx  -Cont Abx  -OOB/IS  -OR this AM

## 2018-07-21 LAB
ANION GAP SERPL CALC-SCNC: 14 MMOL/L — SIGNIFICANT CHANGE UP (ref 5–17)
BASOPHILS NFR BLD AUTO: 0.3 % — SIGNIFICANT CHANGE UP (ref 0–2)
BUN SERPL-MCNC: 10 MG/DL — SIGNIFICANT CHANGE UP (ref 7–23)
CALCIUM SERPL-MCNC: 9.4 MG/DL — SIGNIFICANT CHANGE UP (ref 8.4–10.5)
CHLORIDE SERPL-SCNC: 100 MMOL/L — SIGNIFICANT CHANGE UP (ref 96–108)
CO2 SERPL-SCNC: 25 MMOL/L — SIGNIFICANT CHANGE UP (ref 22–31)
CREAT SERPL-MCNC: 1.01 MG/DL — SIGNIFICANT CHANGE UP (ref 0.5–1.3)
EOSINOPHIL NFR BLD AUTO: 0.9 % — SIGNIFICANT CHANGE UP (ref 0–6)
GLUCOSE SERPL-MCNC: 90 MG/DL — SIGNIFICANT CHANGE UP (ref 70–99)
HCT VFR BLD CALC: 36.4 % — LOW (ref 39–50)
HGB BLD-MCNC: 11.6 G/DL — LOW (ref 13–17)
LYMPHOCYTES # BLD AUTO: 17.9 % — SIGNIFICANT CHANGE UP (ref 13–44)
MAGNESIUM SERPL-MCNC: 1.9 MG/DL — SIGNIFICANT CHANGE UP (ref 1.6–2.6)
MCHC RBC-ENTMCNC: 29.4 PG — SIGNIFICANT CHANGE UP (ref 27–34)
MCHC RBC-ENTMCNC: 31.9 G/DL — LOW (ref 32–36)
MCV RBC AUTO: 92.2 FL — SIGNIFICANT CHANGE UP (ref 80–100)
MONOCYTES NFR BLD AUTO: 14.1 % — HIGH (ref 2–14)
NEUTROPHILS NFR BLD AUTO: 66.8 % — SIGNIFICANT CHANGE UP (ref 43–77)
PHOSPHATE SERPL-MCNC: 3.9 MG/DL — SIGNIFICANT CHANGE UP (ref 2.5–4.5)
PLATELET # BLD AUTO: 392 K/UL — SIGNIFICANT CHANGE UP (ref 150–400)
POTASSIUM SERPL-MCNC: 3.9 MMOL/L — SIGNIFICANT CHANGE UP (ref 3.5–5.3)
POTASSIUM SERPL-SCNC: 3.9 MMOL/L — SIGNIFICANT CHANGE UP (ref 3.5–5.3)
RBC # BLD: 3.95 M/UL — LOW (ref 4.2–5.8)
RBC # FLD: 13.7 % — SIGNIFICANT CHANGE UP (ref 10.3–16.9)
SODIUM SERPL-SCNC: 139 MMOL/L — SIGNIFICANT CHANGE UP (ref 135–145)
WBC # BLD: 15.9 K/UL — HIGH (ref 3.8–10.5)
WBC # FLD AUTO: 15.9 K/UL — HIGH (ref 3.8–10.5)

## 2018-07-21 PROCEDURE — 99233 SBSQ HOSP IP/OBS HIGH 50: CPT | Mod: GC

## 2018-07-21 RX ORDER — LIDOCAINE 4 G/100G
1 CREAM TOPICAL EVERY 4 HOURS
Qty: 0 | Refills: 0 | Status: DISCONTINUED | OUTPATIENT
Start: 2018-07-21 | End: 2018-07-27

## 2018-07-21 RX ADMIN — Medication 250 MILLIGRAM(S): at 21:29

## 2018-07-21 RX ADMIN — Medication 4 MILLIGRAM(S): at 11:16

## 2018-07-21 RX ADMIN — Medication 100 MICROGRAM(S): at 05:21

## 2018-07-21 RX ADMIN — SENNA PLUS 1 TABLET(S): 8.6 TABLET ORAL at 21:29

## 2018-07-21 RX ADMIN — Medication 4 MILLIGRAM(S): at 02:24

## 2018-07-21 RX ADMIN — Medication 4 MILLIGRAM(S): at 17:11

## 2018-07-21 RX ADMIN — Medication 250 MILLIGRAM(S): at 10:31

## 2018-07-21 RX ADMIN — CEFEPIME 100 MILLIGRAM(S): 1 INJECTION, POWDER, FOR SOLUTION INTRAMUSCULAR; INTRAVENOUS at 10:30

## 2018-07-21 RX ADMIN — Medication 4 MILLIGRAM(S): at 21:29

## 2018-07-21 RX ADMIN — Medication 100 MILLIGRAM(S): at 21:29

## 2018-07-21 RX ADMIN — CEFEPIME 100 MILLIGRAM(S): 1 INJECTION, POWDER, FOR SOLUTION INTRAMUSCULAR; INTRAVENOUS at 22:44

## 2018-07-21 RX ADMIN — LIDOCAINE 1 APPLICATION(S): 4 CREAM TOPICAL at 02:23

## 2018-07-21 NOTE — PROGRESS NOTE ADULT - SUBJECTIVE AND OBJECTIVE BOX
IM Consult Progress Note    O/N Events:  Subjective/ROS: Denies HA, CP, SOB, n/v, changes in bowel/urinary habits.  12pt ROS otherwise negative.    VITALS  Vital Signs Last 24 Hrs  T(C): 36.7 (21 Jul 2018 06:02), Max: 37.5 (20 Jul 2018 16:57)  T(F): 98 (21 Jul 2018 06:02), Max: 99.5 (20 Jul 2018 16:57)  HR: 57 (21 Jul 2018 06:02) (56 - 67)  BP: 109/67 (21 Jul 2018 06:02) (105/68 - 124/66)  BP(mean): 85 (20 Jul 2018 13:50) (81 - 91)  RR: 16 (21 Jul 2018 06:02) (10 - 18)  SpO2: 99% (21 Jul 2018 06:02) (97% - 100%)    CAPILLARY BLOOD GLUCOSE    PHYSICAL EXAM  General: NAD; comfortable  HEENT: NC/AT, supple neck, MMM, no JVD  Respiratory: CTA B/L; no wheezes/crackles w/ good air movement  Cardiovascular: Regular rhythm/rate; S1/S2  Gastrointestinal: Soft; NTND; bowel sounds normal  Extremities: WWP; no edema; no clubbing; radial/pedal pulses palpable  Neurological:  A&Ox3; PERRL; EOMI; CNII-XII grossly intact; 5/5 strength; sensation intact; reflexes 2+; no obvious focal deficits  Skin: No rashes or ulcers, normal tugor    MEDICATIONS  (STANDING):  cefepime   IVPB 2000 milliGRAM(s) IV Intermittent every 12 hours  HYDROmorphone PCA (1 mG/mL) 30 milliLiter(s) PCA Continuous PCA Continuous  levothyroxine 100 MICROGram(s) Oral daily  senna 1 Tablet(s) Oral at bedtime  sodium chloride 0.9%. 1000 milliLiter(s) (125 mL/Hr) IV Continuous <Continuous>  vancomycin  IVPB 1000 milliGRAM(s) IV Intermittent every 12 hours  vancomycin  IVPB        MEDICATIONS  (PRN):  acetaminophen   Tablet 650 milliGRAM(s) Oral every 6 hours PRN For Temp greater than 38 C (100.4 F)  acetaminophen   Tablet. 650 milliGRAM(s) Oral every 6 hours PRN Mild Pain (1 - 3)  docusate sodium 100 milliGRAM(s) Oral daily PRN Constipation  HYDROmorphone PCA (1 mG/mL) Rescue Clinician Bolus 0.5 milliGRAM(s) IV Push every 15 minutes PRN for Pain Scale GREATER THAN 8  lidocaine 2% Gel 1 Application(s) Topical every 4 hours PRN pain  naloxone Injectable 0.1 milliGRAM(s) IV Push every 3 minutes PRN For ANY of the following changes in patient status:  A. RR LESS THAN 10 breaths per minute, B. Oxygen saturation LESS THAN 90%, C. Sedation score of 6  nicotine  Polacrilex Gum 4 milliGRAM(s) Oral every 4 hours PRN smoking cessation  ondansetron Injectable 4 milliGRAM(s) IV Push every 6 hours PRN Nausea and/or Vomiting  oxyCODONE    5 mG/acetaminophen 325 mG 1 Tablet(s) Oral every 4 hours PRN Moderate Pain (4 - 6)  oxyCODONE    5 mG/acetaminophen 325 mG 2 Tablet(s) Oral every 6 hours PRN Severe Pain (7 - 10)  polyethylene glycol 3350 17 Gram(s) Oral once PRN Constipation    penicillin (Rash)    LABS                        11.6   15.9  )-----------( 392      ( 21 Jul 2018 06:22 )             36.4     07-21    139  |  100  |  10  ----------------------------<  90  3.9   |  25  |  1.01    Ca    9.4      21 Jul 2018 06:22  Phos  3.9     07-21  Mg     1.9     07-21    IMAGING/EKG/ETC: Personally reviewed IM Consult Progress Note    O/N Events: LEE  Subjective/ROS: Tolerated procedure well.  No BM, +flatus, mcekon in place, eating.  Denies HA, CP, SOB, n/v.  12pt ROS otherwise negative.    VITALS  Vital Signs Last 24 Hrs  T(C): 36.7 (21 Jul 2018 06:02), Max: 37.5 (20 Jul 2018 16:57)  T(F): 98 (21 Jul 2018 06:02), Max: 99.5 (20 Jul 2018 16:57)  HR: 57 (21 Jul 2018 06:02) (56 - 67)  BP: 109/67 (21 Jul 2018 06:02) (105/68 - 124/66)  BP(mean): 85 (20 Jul 2018 13:50) (81 - 91)  RR: 16 (21 Jul 2018 06:02) (10 - 18)  SpO2: 99% (21 Jul 2018 06:02) (97% - 100%)    CAPILLARY BLOOD GLUCOSE    PHYSICAL EXAM  General: NAD; comfortable  HEENT: NC/AT, supple neck, MMM, no JVD  Respiratory: CTA B/L; no wheezes/crackles w/ good air movement  Cardiovascular: Regular rhythm/rate; S1/S2  Gastrointestinal: Soft; NTND; bowel sounds normal  Extremities: WWP; no edema; no clubbing; radial/pedal pulses palpable  Neurological:  A&Ox3; PERRL; EOMI; CNII-XII grossly intact; 5/5 strength; sensation intact; reflexes 2+; no obvious focal deficits  Skin: No rashes or ulcers, normal tugor    MEDICATIONS  (STANDING):  cefepime   IVPB 2000 milliGRAM(s) IV Intermittent every 12 hours  HYDROmorphone PCA (1 mG/mL) 30 milliLiter(s) PCA Continuous PCA Continuous  levothyroxine 100 MICROGram(s) Oral daily  senna 1 Tablet(s) Oral at bedtime  sodium chloride 0.9%. 1000 milliLiter(s) (125 mL/Hr) IV Continuous <Continuous>  vancomycin  IVPB 1000 milliGRAM(s) IV Intermittent every 12 hours  vancomycin  IVPB        MEDICATIONS  (PRN):  acetaminophen   Tablet 650 milliGRAM(s) Oral every 6 hours PRN For Temp greater than 38 C (100.4 F)  acetaminophen   Tablet. 650 milliGRAM(s) Oral every 6 hours PRN Mild Pain (1 - 3)  docusate sodium 100 milliGRAM(s) Oral daily PRN Constipation  HYDROmorphone PCA (1 mG/mL) Rescue Clinician Bolus 0.5 milliGRAM(s) IV Push every 15 minutes PRN for Pain Scale GREATER THAN 8  lidocaine 2% Gel 1 Application(s) Topical every 4 hours PRN pain  naloxone Injectable 0.1 milliGRAM(s) IV Push every 3 minutes PRN For ANY of the following changes in patient status:  A. RR LESS THAN 10 breaths per minute, B. Oxygen saturation LESS THAN 90%, C. Sedation score of 6  nicotine  Polacrilex Gum 4 milliGRAM(s) Oral every 4 hours PRN smoking cessation  ondansetron Injectable 4 milliGRAM(s) IV Push every 6 hours PRN Nausea and/or Vomiting  oxyCODONE    5 mG/acetaminophen 325 mG 1 Tablet(s) Oral every 4 hours PRN Moderate Pain (4 - 6)  oxyCODONE    5 mG/acetaminophen 325 mG 2 Tablet(s) Oral every 6 hours PRN Severe Pain (7 - 10)  polyethylene glycol 3350 17 Gram(s) Oral once PRN Constipation    penicillin (Rash)    LABS                        11.6   15.9  )-----------( 392      ( 21 Jul 2018 06:22 )             36.4     07-21    139  |  100  |  10  ----------------------------<  90  3.9   |  25  |  1.01    Ca    9.4      21 Jul 2018 06:22  Phos  3.9     07-21  Mg     1.9     07-21    IMAGING/EKG/ETC: Personally reviewed

## 2018-07-21 NOTE — PROGRESS NOTE ADULT - PROBLEM SELECTOR PLAN 1
S/p penile prosthesis revision/removal.  No history of cardiac disease, DM, or CVA.  Physically active.  Low risk procedure.  RCRI: Class I  METS: >4  - Tolerated procedure well  - Low risk for low risk procedure  - C/w levothyroxine  - Patient medically optimized. S/p penile prosthesis revision/removal.  No history of cardiac disease, DM, or CVA.  Physically active.  Low risk procedure.  RCRI: Class I  METS: >4  - Tolerated procedure well  - Low risk for low risk procedure  - C/w levothyroxine

## 2018-07-21 NOTE — PROGRESS NOTE ADULT - SUBJECTIVE AND OBJECTIVE BOX
Interval Events:  Patient seen and examined at bedside. No events overnight    MEDICATIONS:  Pulmonary:    Antimicrobials:  cefepime   IVPB 2000 milliGRAM(s) IV Intermittent every 12 hours  vancomycin  IVPB 1000 milliGRAM(s) IV Intermittent every 12 hours  vancomycin  IVPB        Anticoagulants:    Cardiac:    Endocrine:  levothyroxine 100 MICROGram(s) Oral daily    Allergies    penicillin (Rash)    Intolerances        Vital Signs Last 24 Hrs  T(C): 36.8 (21 Jul 2018 00:21), Max: 37.5 (20 Jul 2018 16:57)  T(F): 98.2 (21 Jul 2018 00:21), Max: 99.5 (20 Jul 2018 16:57)  HR: 65 (21 Jul 2018 00:21) (56 - 67)  BP: 110/71 (21 Jul 2018 00:21) (105/68 - 124/66)  BP(mean): 85 (20 Jul 2018 13:50) (81 - 91)  RR: 16 (21 Jul 2018 00:21) (10 - 18)  SpO2: 98% (21 Jul 2018 00:21) (97% - 100%)    07-19 @ 07:01 - 07-20 @ 07:00  --------------------------------------------------------  IN: 1725 mL / OUT: 2650 mL / NET: -925 mL    07-20 @ 07:01 - 07-21 @ 05:58  --------------------------------------------------------  IN: 965 mL / OUT: 2200 mL / NET: -1235 mL          LABS:      CBC Full  -  ( 20 Jul 2018 06:37 )  WBC Count : 11.4 K/uL  Hemoglobin : 13.2 g/dL  Hematocrit : 40.1 %  Platelet Count - Automated : 454 K/uL  Mean Cell Volume : 90.5 fL  Mean Cell Hemoglobin : 29.8 pg  Mean Cell Hemoglobin Concentration : 32.9 g/dL  Auto Neutrophil # : x  Auto Lymphocyte # : x  Auto Monocyte # : x  Auto Eosinophil # : x  Auto Basophil # : x  Auto Neutrophil % : 59.0 %  Auto Lymphocyte % : 26.7 %  Auto Monocyte % : 11.2 %  Auto Eosinophil % : 2.5 %  Auto Basophil % : 0.6 %    07-20    141  |  102  |  12  ----------------------------<  124<H>  3.9   |  26  |  0.94    Ca    9.6      20 Jul 2018 06:37  Phos  3.2     07-20  Mg     2.2     07-20      PE  Gen:  Abd:  :                  RADIOLOGY & ADDITIONAL STUDIES (The following images were personally reviewed): Interval Events:  Patient seen and examined at bedside. No events overnight    MEDICATIONS:  Pulmonary:    Antimicrobials:  cefepime   IVPB 2000 milliGRAM(s) IV Intermittent every 12 hours  vancomycin  IVPB 1000 milliGRAM(s) IV Intermittent every 12 hours  vancomycin  IVPB        Anticoagulants:    Cardiac:    Endocrine:  levothyroxine 100 MICROGram(s) Oral daily    Allergies    penicillin (Rash)    Intolerances        Vital Signs Last 24 Hrs  T(C): 36.8 (21 Jul 2018 00:21), Max: 37.5 (20 Jul 2018 16:57)  T(F): 98.2 (21 Jul 2018 00:21), Max: 99.5 (20 Jul 2018 16:57)  HR: 65 (21 Jul 2018 00:21) (56 - 67)  BP: 110/71 (21 Jul 2018 00:21) (105/68 - 124/66)  BP(mean): 85 (20 Jul 2018 13:50) (81 - 91)  RR: 16 (21 Jul 2018 00:21) (10 - 18)  SpO2: 98% (21 Jul 2018 00:21) (97% - 100%)    07-19 @ 07:01 - 07-20 @ 07:00  --------------------------------------------------------  IN: 1725 mL / OUT: 2650 mL / NET: -925 mL    07-20 @ 07:01 - 07-21 @ 05:58  --------------------------------------------------------  IN: 965 mL / OUT: 2200 mL / NET: -1235 mL          LABS:      CBC Full  -  ( 20 Jul 2018 06:37 )  WBC Count : 11.4 K/uL  Hemoglobin : 13.2 g/dL  Hematocrit : 40.1 %  Platelet Count - Automated : 454 K/uL  Mean Cell Volume : 90.5 fL  Mean Cell Hemoglobin : 29.8 pg  Mean Cell Hemoglobin Concentration : 32.9 g/dL  Auto Neutrophil # : x  Auto Lymphocyte # : x  Auto Monocyte # : x  Auto Eosinophil # : x  Auto Basophil # : x  Auto Neutrophil % : 59.0 %  Auto Lymphocyte % : 26.7 %  Auto Monocyte % : 11.2 %  Auto Eosinophil % : 2.5 %  Auto Basophil % : 0.6 %    07-20    141  |  102  |  12  ----------------------------<  124<H>  3.9   |  26  |  0.94    Ca    9.6      20 Jul 2018 06:37  Phos  3.2     07-20  Mg     2.2     07-20      PE  Gen: Awake and alert, NAD  Abd: soft, nontender, nondistended  : mckeon in place and draining clear urine, scrotal support, dressing intact                  RADIOLOGY & ADDITIONAL STUDIES (The following images were personally reviewed):

## 2018-07-21 NOTE — PROGRESS NOTE ADULT - PROBLEM SELECTOR PLAN 1
-f/u am labs  -diet- reg  -cont mckeon and dressing change, penrose drain in place  -ambulate as tolerated  -dvt ppx- scd's  -Cont cefepime  -f/u cultures -f/u am labs  -diet- reg  -cont mckeon and dressing change, penrose drain in place  -ambulate as tolerated  -dvt ppx- scd's  -Cont cefepime, vancomycin  -f/u cultures

## 2018-07-22 LAB
ANION GAP SERPL CALC-SCNC: 13 MMOL/L — SIGNIFICANT CHANGE UP (ref 5–17)
ANION GAP SERPL CALC-SCNC: 14 MMOL/L — SIGNIFICANT CHANGE UP (ref 5–17)
BASOPHILS NFR BLD AUTO: 0.2 % — SIGNIFICANT CHANGE UP (ref 0–2)
BASOPHILS NFR BLD AUTO: 0.2 % — SIGNIFICANT CHANGE UP (ref 0–2)
BUN SERPL-MCNC: 7 MG/DL — SIGNIFICANT CHANGE UP (ref 7–23)
BUN SERPL-MCNC: 8 MG/DL — SIGNIFICANT CHANGE UP (ref 7–23)
CALCIUM SERPL-MCNC: 10.2 MG/DL — SIGNIFICANT CHANGE UP (ref 8.4–10.5)
CALCIUM SERPL-MCNC: 9.9 MG/DL — SIGNIFICANT CHANGE UP (ref 8.4–10.5)
CHLORIDE SERPL-SCNC: 98 MMOL/L — SIGNIFICANT CHANGE UP (ref 96–108)
CHLORIDE SERPL-SCNC: 99 MMOL/L — SIGNIFICANT CHANGE UP (ref 96–108)
CO2 SERPL-SCNC: 26 MMOL/L — SIGNIFICANT CHANGE UP (ref 22–31)
CO2 SERPL-SCNC: 30 MMOL/L — SIGNIFICANT CHANGE UP (ref 22–31)
CREAT SERPL-MCNC: 0.74 MG/DL — SIGNIFICANT CHANGE UP (ref 0.5–1.3)
CREAT SERPL-MCNC: 0.85 MG/DL — SIGNIFICANT CHANGE UP (ref 0.5–1.3)
EOSINOPHIL NFR BLD AUTO: 1 % — SIGNIFICANT CHANGE UP (ref 0–6)
EOSINOPHIL NFR BLD AUTO: 1.8 % — SIGNIFICANT CHANGE UP (ref 0–6)
GLUCOSE SERPL-MCNC: 102 MG/DL — HIGH (ref 70–99)
GLUCOSE SERPL-MCNC: 169 MG/DL — HIGH (ref 70–99)
HCT VFR BLD CALC: 38.9 % — LOW (ref 39–50)
HCT VFR BLD CALC: 38.9 % — LOW (ref 39–50)
HGB BLD-MCNC: 12.5 G/DL — LOW (ref 13–17)
HGB BLD-MCNC: 12.6 G/DL — LOW (ref 13–17)
LYMPHOCYTES # BLD AUTO: 12.5 % — LOW (ref 13–44)
LYMPHOCYTES # BLD AUTO: 14.3 % — SIGNIFICANT CHANGE UP (ref 13–44)
MAGNESIUM SERPL-MCNC: 2 MG/DL — SIGNIFICANT CHANGE UP (ref 1.6–2.6)
MCHC RBC-ENTMCNC: 29.4 PG — SIGNIFICANT CHANGE UP (ref 27–34)
MCHC RBC-ENTMCNC: 29.6 PG — SIGNIFICANT CHANGE UP (ref 27–34)
MCHC RBC-ENTMCNC: 32.1 G/DL — SIGNIFICANT CHANGE UP (ref 32–36)
MCHC RBC-ENTMCNC: 32.4 G/DL — SIGNIFICANT CHANGE UP (ref 32–36)
MCV RBC AUTO: 90.9 FL — SIGNIFICANT CHANGE UP (ref 80–100)
MCV RBC AUTO: 92.2 FL — SIGNIFICANT CHANGE UP (ref 80–100)
MONOCYTES NFR BLD AUTO: 12.3 % — SIGNIFICANT CHANGE UP (ref 2–14)
MONOCYTES NFR BLD AUTO: 9.7 % — SIGNIFICANT CHANGE UP (ref 2–14)
NEUTROPHILS NFR BLD AUTO: 74 % — SIGNIFICANT CHANGE UP (ref 43–77)
NEUTROPHILS NFR BLD AUTO: 74 % — SIGNIFICANT CHANGE UP (ref 43–77)
PHOSPHATE SERPL-MCNC: 3.8 MG/DL — SIGNIFICANT CHANGE UP (ref 2.5–4.5)
PLATELET # BLD AUTO: 409 K/UL — HIGH (ref 150–400)
PLATELET # BLD AUTO: 423 K/UL — HIGH (ref 150–400)
POTASSIUM SERPL-MCNC: 3.8 MMOL/L — SIGNIFICANT CHANGE UP (ref 3.5–5.3)
POTASSIUM SERPL-MCNC: 4.2 MMOL/L — SIGNIFICANT CHANGE UP (ref 3.5–5.3)
POTASSIUM SERPL-SCNC: 3.8 MMOL/L — SIGNIFICANT CHANGE UP (ref 3.5–5.3)
POTASSIUM SERPL-SCNC: 4.2 MMOL/L — SIGNIFICANT CHANGE UP (ref 3.5–5.3)
RBC # BLD: 4.22 M/UL — SIGNIFICANT CHANGE UP (ref 4.2–5.8)
RBC # BLD: 4.28 M/UL — SIGNIFICANT CHANGE UP (ref 4.2–5.8)
RBC # FLD: 13.7 % — SIGNIFICANT CHANGE UP (ref 10.3–16.9)
RBC # FLD: 13.9 % — SIGNIFICANT CHANGE UP (ref 10.3–16.9)
SODIUM SERPL-SCNC: 138 MMOL/L — SIGNIFICANT CHANGE UP (ref 135–145)
SODIUM SERPL-SCNC: 142 MMOL/L — SIGNIFICANT CHANGE UP (ref 135–145)
VANCOMYCIN TROUGH SERPL-MCNC: 8.5 UG/ML — LOW (ref 10–20)
VANCOMYCIN TROUGH SERPL-MCNC: 9.4 UG/ML — LOW (ref 10–20)
WBC # BLD: 16.4 K/UL — HIGH (ref 3.8–10.5)
WBC # BLD: 17.1 K/UL — HIGH (ref 3.8–10.5)
WBC # FLD AUTO: 16.4 K/UL — HIGH (ref 3.8–10.5)
WBC # FLD AUTO: 17.1 K/UL — HIGH (ref 3.8–10.5)

## 2018-07-22 PROCEDURE — 99233 SBSQ HOSP IP/OBS HIGH 50: CPT

## 2018-07-22 RX ORDER — VANCOMYCIN HCL 1 G
1000 VIAL (EA) INTRAVENOUS ONCE
Qty: 0 | Refills: 0 | Status: COMPLETED | OUTPATIENT
Start: 2018-07-22 | End: 2018-07-22

## 2018-07-22 RX ORDER — DOCUSATE SODIUM 100 MG
100 CAPSULE ORAL THREE TIMES A DAY
Qty: 0 | Refills: 0 | Status: DISCONTINUED | OUTPATIENT
Start: 2018-07-22 | End: 2018-07-27

## 2018-07-22 RX ORDER — VANCOMYCIN HCL 1 G
1000 VIAL (EA) INTRAVENOUS EVERY 8 HOURS
Qty: 0 | Refills: 0 | Status: DISCONTINUED | OUTPATIENT
Start: 2018-07-22 | End: 2018-07-27

## 2018-07-22 RX ORDER — VANCOMYCIN HCL 1 G
VIAL (EA) INTRAVENOUS
Qty: 0 | Refills: 0 | Status: DISCONTINUED | OUTPATIENT
Start: 2018-07-22 | End: 2018-07-27

## 2018-07-22 RX ADMIN — Medication 100 MICROGRAM(S): at 05:42

## 2018-07-22 RX ADMIN — Medication 250 MILLIGRAM(S): at 18:12

## 2018-07-22 RX ADMIN — OXYCODONE AND ACETAMINOPHEN 2 TABLET(S): 5; 325 TABLET ORAL at 20:45

## 2018-07-22 RX ADMIN — SENNA PLUS 1 TABLET(S): 8.6 TABLET ORAL at 21:26

## 2018-07-22 RX ADMIN — Medication 4 MILLIGRAM(S): at 06:11

## 2018-07-22 RX ADMIN — OXYCODONE AND ACETAMINOPHEN 2 TABLET(S): 5; 325 TABLET ORAL at 19:45

## 2018-07-22 RX ADMIN — POLYETHYLENE GLYCOL 3350 17 GRAM(S): 17 POWDER, FOR SOLUTION ORAL at 21:26

## 2018-07-22 RX ADMIN — CEFEPIME 100 MILLIGRAM(S): 1 INJECTION, POWDER, FOR SOLUTION INTRAMUSCULAR; INTRAVENOUS at 10:16

## 2018-07-22 RX ADMIN — Medication 4 MILLIGRAM(S): at 18:14

## 2018-07-22 RX ADMIN — CEFEPIME 100 MILLIGRAM(S): 1 INJECTION, POWDER, FOR SOLUTION INTRAMUSCULAR; INTRAVENOUS at 21:26

## 2018-07-22 RX ADMIN — Medication 250 MILLIGRAM(S): at 10:17

## 2018-07-22 RX ADMIN — Medication 4 MILLIGRAM(S): at 12:59

## 2018-07-22 RX ADMIN — HYDROMORPHONE HYDROCHLORIDE 0.5 MILLIGRAM(S): 2 INJECTION INTRAMUSCULAR; INTRAVENOUS; SUBCUTANEOUS at 18:16

## 2018-07-22 NOTE — PROGRESS NOTE ADULT - SUBJECTIVE AND OBJECTIVE BOX
AM Note    No acute events overnight.      Vital Signs Last 24 Hrs  T(C): 36.4 (07-22-18 @ 06:02), Max: 37.5 (07-21-18 @ 20:25)  T(F): 97.5 (07-22-18 @ 06:02), Max: 99.5 (07-21-18 @ 20:25)  HR: 63 (07-22-18 @ 06:02) (60 - 75)  BP: 105/71 (07-22-18 @ 06:02) (102/68 - 110/70)  BP(mean): --  RR: 17 (07-22-18 @ 06:02) (16 - 17)  SpO2: 99% (07-22-18 @ 06:02) (97% - 99%)     21 Jul 2018 06:22    139    |  100    |  10     ----------------------------<  90     3.9     |  25     |  1.01     Ca    9.4        21 Jul 2018 06:22  Phos  3.9       21 Jul 2018 06:22  Mg     1.9       21 Jul 2018 06:22                            11.6   15.9  )-----------( 392      ( 21 Jul 2018 06:22 )             36.4         I&O's Summary    20 Jul 2018 07:01  -  21 Jul 2018 07:00  --------------------------------------------------------  IN: 1085 mL / OUT: 2700 mL / NET: -1615 mL    21 Jul 2018 07:01  -  22 Jul 2018 06:46  --------------------------------------------------------  IN: 3320 mL / OUT: 4400 mL / NET: -1080 mL          PHYSICAL EXAM:    GEN: NAD  ABD: soft, ntnd  : mckeon in place draining clear, penrose in place, dressing cdi

## 2018-07-22 NOTE — PROGRESS NOTE ADULT - SUBJECTIVE AND OBJECTIVE BOX
Patient is a 48y old  Male who presents with a chief complaint of Infected penile prosthesis (18 Jul 2018 22:49)      24 hour events: None    ROS: No BM x 3 days    Vital Signs Last 24 Hrs  T(C): 36.3 (22 Jul 2018 14:07), Max: 37.5 (21 Jul 2018 20:25)  T(F): 97.3 (22 Jul 2018 14:07), Max: 99.5 (21 Jul 2018 20:25)  HR: 85 (22 Jul 2018 14:07) (63 - 85)  BP: 110/74 (22 Jul 2018 14:07) (102/68 - 110/74)  BP(mean): --  RR: 17 (22 Jul 2018 14:07) (16 - 17)  SpO2: 98% (22 Jul 2018 14:07) (97% - 99%)  I&O's Summary    21 Jul 2018 07:01  -  22 Jul 2018 07:00  --------------------------------------------------------  IN: 3320 mL / OUT: 4400 mL / NET: -1080 mL    22 Jul 2018 07:01  -  22 Jul 2018 15:33  --------------------------------------------------------  IN: 530 mL / OUT: 2100 mL / NET: -1570 mL      CAPILLARY BLOOD GLUCOSE        PHYSICAL EXAM:      Constitutional: NAD    Respiratory: CTAB    Cardiovascular: RRR    Gastrointestinal: NTND, decreased BS                          12.6   16.4  )-----------( 423      ( 22 Jul 2018 12:22 )             38.9     07-22    138  |  98  |  8   ----------------------------<  169<H>  3.8   |  26  |  0.74    Ca    9.9      22 Jul 2018 12:22  Phos  3.8     07-22  Mg     2.0     07-22      Imaging:     MEDICATIONS  (STANDING):  cefepime   IVPB 2000 milliGRAM(s) IV Intermittent every 12 hours  HYDROmorphone PCA (1 mG/mL) 30 milliLiter(s) PCA Continuous PCA Continuous  levothyroxine 100 MICROGram(s) Oral daily  senna 1 Tablet(s) Oral at bedtime  vancomycin  IVPB 1000 milliGRAM(s) IV Intermittent every 8 hours  vancomycin  IVPB        MEDICATIONS  (PRN):  acetaminophen   Tablet 650 milliGRAM(s) Oral every 6 hours PRN For Temp greater than 38 C (100.4 F)  acetaminophen   Tablet. 650 milliGRAM(s) Oral every 6 hours PRN Mild Pain (1 - 3)  docusate sodium 100 milliGRAM(s) Oral three times a day PRN Constipation  HYDROmorphone PCA (1 mG/mL) Rescue Clinician Bolus 0.5 milliGRAM(s) IV Push every 15 minutes PRN for Pain Scale GREATER THAN 8  lidocaine 2% Gel 1 Application(s) Topical every 4 hours PRN pain  naloxone Injectable 0.1 milliGRAM(s) IV Push every 3 minutes PRN For ANY of the following changes in patient status:  A. RR LESS THAN 10 breaths per minute, B. Oxygen saturation LESS THAN 90%, C. Sedation score of 6  nicotine  Polacrilex Gum 4 milliGRAM(s) Oral every 4 hours PRN smoking cessation  ondansetron Injectable 4 milliGRAM(s) IV Push every 6 hours PRN Nausea and/or Vomiting  oxyCODONE    5 mG/acetaminophen 325 mG 1 Tablet(s) Oral every 4 hours PRN Moderate Pain (4 - 6)  oxyCODONE    5 mG/acetaminophen 325 mG 2 Tablet(s) Oral every 6 hours PRN Severe Pain (7 - 10)  polyethylene glycol 3350 17 Gram(s) Oral once PRN Constipation      This is a 48y Male with a history of Hypothyroid   admitted for TESTICULAR SWELLINGPOST OP COMPLICATION  .  HEALTH ISSUES - PROBLEM Dx:  Hypothyroid: Hypothyroid - continue synthroid  Leukocytosis: Leukocytosis - stable, likely 2/2 post-surgical inflam as no signs of new or worsening infection  Infected penile implant: Infected penile implant - Cx NGTD, broad spectrum abx coverage per , pain controlled  Constipation - change miralax to standing daily w/ first dose now, and dulcolax supp prn

## 2018-07-23 LAB
ANION GAP SERPL CALC-SCNC: 11 MMOL/L — SIGNIFICANT CHANGE UP (ref 5–17)
BASOPHILS NFR BLD AUTO: 0.4 % — SIGNIFICANT CHANGE UP (ref 0–2)
BUN SERPL-MCNC: 10 MG/DL — SIGNIFICANT CHANGE UP (ref 7–23)
CALCIUM SERPL-MCNC: 10.7 MG/DL — HIGH (ref 8.4–10.5)
CHLORIDE SERPL-SCNC: 98 MMOL/L — SIGNIFICANT CHANGE UP (ref 96–108)
CO2 SERPL-SCNC: 31 MMOL/L — SIGNIFICANT CHANGE UP (ref 22–31)
CREAT SERPL-MCNC: 0.86 MG/DL — SIGNIFICANT CHANGE UP (ref 0.5–1.3)
CULTURE RESULTS: NO GROWTH — SIGNIFICANT CHANGE UP
EOSINOPHIL NFR BLD AUTO: 3.1 % — SIGNIFICANT CHANGE UP (ref 0–6)
GLUCOSE SERPL-MCNC: 104 MG/DL — HIGH (ref 70–99)
HCT VFR BLD CALC: 40.2 % — SIGNIFICANT CHANGE UP (ref 39–50)
HGB BLD-MCNC: 12.9 G/DL — LOW (ref 13–17)
LYMPHOCYTES # BLD AUTO: 14.8 % — SIGNIFICANT CHANGE UP (ref 13–44)
MAGNESIUM SERPL-MCNC: 2.1 MG/DL — SIGNIFICANT CHANGE UP (ref 1.6–2.6)
MCHC RBC-ENTMCNC: 29.6 PG — SIGNIFICANT CHANGE UP (ref 27–34)
MCHC RBC-ENTMCNC: 32.1 G/DL — SIGNIFICANT CHANGE UP (ref 32–36)
MCV RBC AUTO: 92.2 FL — SIGNIFICANT CHANGE UP (ref 80–100)
MONOCYTES NFR BLD AUTO: 12.3 % — SIGNIFICANT CHANGE UP (ref 2–14)
NEUTROPHILS NFR BLD AUTO: 69.4 % — SIGNIFICANT CHANGE UP (ref 43–77)
PHOSPHATE SERPL-MCNC: 3.9 MG/DL — SIGNIFICANT CHANGE UP (ref 2.5–4.5)
PLATELET # BLD AUTO: 407 K/UL — HIGH (ref 150–400)
POTASSIUM SERPL-MCNC: 4.3 MMOL/L — SIGNIFICANT CHANGE UP (ref 3.5–5.3)
POTASSIUM SERPL-SCNC: 4.3 MMOL/L — SIGNIFICANT CHANGE UP (ref 3.5–5.3)
RBC # BLD: 4.36 M/UL — SIGNIFICANT CHANGE UP (ref 4.2–5.8)
RBC # FLD: 13.7 % — SIGNIFICANT CHANGE UP (ref 10.3–16.9)
SODIUM SERPL-SCNC: 140 MMOL/L — SIGNIFICANT CHANGE UP (ref 135–145)
SPECIMEN SOURCE: SIGNIFICANT CHANGE UP
VANCOMYCIN TROUGH SERPL-MCNC: 14.2 UG/ML — SIGNIFICANT CHANGE UP (ref 10–20)
WBC # BLD: 15.9 K/UL — HIGH (ref 3.8–10.5)
WBC # FLD AUTO: 15.9 K/UL — HIGH (ref 3.8–10.5)

## 2018-07-23 PROCEDURE — 99233 SBSQ HOSP IP/OBS HIGH 50: CPT | Mod: GC

## 2018-07-23 RX ADMIN — SENNA PLUS 1 TABLET(S): 8.6 TABLET ORAL at 21:03

## 2018-07-23 RX ADMIN — Medication 4 MILLIGRAM(S): at 13:52

## 2018-07-23 RX ADMIN — Medication 250 MILLIGRAM(S): at 19:36

## 2018-07-23 RX ADMIN — CEFEPIME 100 MILLIGRAM(S): 1 INJECTION, POWDER, FOR SOLUTION INTRAMUSCULAR; INTRAVENOUS at 10:19

## 2018-07-23 RX ADMIN — Medication 4 MILLIGRAM(S): at 09:36

## 2018-07-23 RX ADMIN — CEFEPIME 100 MILLIGRAM(S): 1 INJECTION, POWDER, FOR SOLUTION INTRAMUSCULAR; INTRAVENOUS at 21:03

## 2018-07-23 RX ADMIN — Medication 4 MILLIGRAM(S): at 19:36

## 2018-07-23 RX ADMIN — Medication 4 MILLIGRAM(S): at 04:18

## 2018-07-23 RX ADMIN — Medication 100 MICROGRAM(S): at 05:29

## 2018-07-23 RX ADMIN — Medication 250 MILLIGRAM(S): at 01:05

## 2018-07-23 RX ADMIN — Medication 100 MILLIGRAM(S): at 18:36

## 2018-07-23 RX ADMIN — Medication 250 MILLIGRAM(S): at 12:51

## 2018-07-23 NOTE — PROGRESS NOTE ADULT - PROBLEM SELECTOR PLAN 3
Likely 2/2 infected penile prosthesis.  Uptrend likely 2/2 post-procedure reactivity.  Does not meet SIRS/Sepsis criteria.  - C/w abx as per above.
Improving.  Does not meet SIRS/Sepsis criteria.  - C/w abx as per above.
Likely 2/2 infected penile prosthesis.  Does not meet SIRS/Sepsis criteria.  - C/w abx as per above.

## 2018-07-23 NOTE — DIETITIAN INITIAL EVALUATION ADULT. - ENERGY NEEDS
Height: 6'2" Weight: 171lbs, IBW 190lbs+/-10%, %IBW 90%, BMI 22  ABW used for calculations as pt between % of IBW.   Nutrient needs based on [facility] standards of care for maintenance in adults.   needs adjusted for post-op

## 2018-07-23 NOTE — PROGRESS NOTE ADULT - SUBJECTIVE AND OBJECTIVE BOX
IM Consult Progress Note    O/N Events:  Subjective/ROS: Denies HA, CP, SOB, n/v, changes in bowel/urinary habits.  12pt ROS otherwise negative.    VITALS  Vital Signs Last 24 Hrs  T(C): 36.9 (23 Jul 2018 05:56), Max: 37.1 (22 Jul 2018 16:21)  T(F): 98.4 (23 Jul 2018 05:56), Max: 98.8 (22 Jul 2018 16:21)  HR: 70 (23 Jul 2018 05:56) (65 - 85)  BP: 111/78 (23 Jul 2018 05:56) (102/68 - 115/77)  BP(mean): --  RR: 17 (23 Jul 2018 05:56) (16 - 17)  SpO2: 98% (23 Jul 2018 05:56) (97% - 99%)    CAPILLARY BLOOD GLUCOSE    PHYSICAL EXAM  General: NAD; comfortable  HEENT: NC/AT, supple neck, MMM, no JVD  Respiratory: CTA B/L; no wheezes/crackles w/ good air movement  Cardiovascular: Regular rhythm/rate; S1/S2  Gastrointestinal: Soft; NTND; bowel sounds normal  :   Extremities: WWP; no edema; no clubbing; radial/pedal pulses palpable  Neurological:  A&Ox3; PERRL; EOMI; CNII-XII grossly intact; 5/5 strength; sensation intact; reflexes 2+; no obvious focal deficits  Skin: No rashes or ulcers, normal tugor    MEDICATIONS  (STANDING):  cefepime   IVPB 2000 milliGRAM(s) IV Intermittent every 12 hours  levothyroxine 100 MICROGram(s) Oral daily  senna 1 Tablet(s) Oral at bedtime  vancomycin  IVPB 1000 milliGRAM(s) IV Intermittent every 8 hours  vancomycin  IVPB        MEDICATIONS  (PRN):  acetaminophen   Tablet 650 milliGRAM(s) Oral every 6 hours PRN For Temp greater than 38 C (100.4 F)  acetaminophen   Tablet. 650 milliGRAM(s) Oral every 6 hours PRN Mild Pain (1 - 3)  docusate sodium 100 milliGRAM(s) Oral three times a day PRN Constipation  lidocaine 2% Gel 1 Application(s) Topical every 4 hours PRN pain  naloxone Injectable 0.1 milliGRAM(s) IV Push every 3 minutes PRN For ANY of the following changes in patient status:  A. RR LESS THAN 10 breaths per minute, B. Oxygen saturation LESS THAN 90%, C. Sedation score of 6  nicotine  Polacrilex Gum 4 milliGRAM(s) Oral every 4 hours PRN smoking cessation  ondansetron Injectable 4 milliGRAM(s) IV Push every 6 hours PRN Nausea and/or Vomiting  oxyCODONE    5 mG/acetaminophen 325 mG 1 Tablet(s) Oral every 4 hours PRN Moderate Pain (4 - 6)  oxyCODONE    5 mG/acetaminophen 325 mG 2 Tablet(s) Oral every 6 hours PRN Severe Pain (7 - 10)      penicillin (Rash)      LABS                        12.9   15.9  )-----------( 407      ( 23 Jul 2018 06:22 )             40.2     07-23    140  |  98  |  10  ----------------------------<  104<H>  4.3   |  31  |  0.86    Ca    10.7<H>      23 Jul 2018 06:22  Phos  3.9     07-23  Mg     2.1     07-23    IMAGING/EKG/ETC: Personally reviewed IM Consult Progress Note    O/N Events: ELE  Subjective/ROS: Still no BM.  Concerned about WBCs.  Mckeon removed this AM.  Denies HA, CP, SOB, n/v.  12pt ROS otherwise negative.    VITALS  Vital Signs Last 24 Hrs  T(C): 36.9 (23 Jul 2018 05:56), Max: 37.1 (22 Jul 2018 16:21)  T(F): 98.4 (23 Jul 2018 05:56), Max: 98.8 (22 Jul 2018 16:21)  HR: 70 (23 Jul 2018 05:56) (65 - 85)  BP: 111/78 (23 Jul 2018 05:56) (102/68 - 115/77)  BP(mean): --  RR: 17 (23 Jul 2018 05:56) (16 - 17)  SpO2: 98% (23 Jul 2018 05:56) (97% - 99%)    CAPILLARY BLOOD GLUCOSE    PHYSICAL EXAM  General: NAD; comfortable  HEENT: NC/AT, supple neck, MMM, no JVD  Respiratory: CTA B/L; no wheezes/crackles w/ good air movement  Cardiovascular: Regular rhythm/rate; S1/S2  Gastrointestinal: Soft; NTND; bowel sounds normal  : Bandaged up in sling, no mckeon  Extremities: WWP; no edema; no clubbing; radial/pedal pulses palpable  Neurological:  A&Ox3; PERRL; EOMI; CNII-XII grossly intact; 5/5 strength; sensation intact; reflexes 2+; no obvious focal deficits  Skin: No rashes or ulcers, normal tugor    MEDICATIONS  (STANDING):  cefepime   IVPB 2000 milliGRAM(s) IV Intermittent every 12 hours  levothyroxine 100 MICROGram(s) Oral daily  senna 1 Tablet(s) Oral at bedtime  vancomycin  IVPB 1000 milliGRAM(s) IV Intermittent every 8 hours  vancomycin  IVPB        MEDICATIONS  (PRN):  acetaminophen   Tablet 650 milliGRAM(s) Oral every 6 hours PRN For Temp greater than 38 C (100.4 F)  acetaminophen   Tablet. 650 milliGRAM(s) Oral every 6 hours PRN Mild Pain (1 - 3)  docusate sodium 100 milliGRAM(s) Oral three times a day PRN Constipation  lidocaine 2% Gel 1 Application(s) Topical every 4 hours PRN pain  naloxone Injectable 0.1 milliGRAM(s) IV Push every 3 minutes PRN For ANY of the following changes in patient status:  A. RR LESS THAN 10 breaths per minute, B. Oxygen saturation LESS THAN 90%, C. Sedation score of 6  nicotine  Polacrilex Gum 4 milliGRAM(s) Oral every 4 hours PRN smoking cessation  ondansetron Injectable 4 milliGRAM(s) IV Push every 6 hours PRN Nausea and/or Vomiting  oxyCODONE    5 mG/acetaminophen 325 mG 1 Tablet(s) Oral every 4 hours PRN Moderate Pain (4 - 6)  oxyCODONE    5 mG/acetaminophen 325 mG 2 Tablet(s) Oral every 6 hours PRN Severe Pain (7 - 10)      penicillin (Rash)      LABS                        12.9   15.9  )-----------( 407      ( 23 Jul 2018 06:22 )             40.2     07-23    140  |  98  |  10  ----------------------------<  104<H>  4.3   |  31  |  0.86    Ca    10.7<H>      23 Jul 2018 06:22  Phos  3.9     07-23  Mg     2.1     07-23    IMAGING/EKG/ETC: Personally reviewed

## 2018-07-23 NOTE — PROGRESS NOTE ADULT - PROBLEM SELECTOR PLAN 2
W/ penicillin allergy.    - Given foreign body, will need pseudomonal and MRSA coverage.  - C/w cefepime (tolerated keflex previously, low risk for cross-reactivity to penicillin) and vancomycin  - F/u cultures when avail.
W/ penicillin allergy.    - Given foreign body, will need pseudomonal and MRSA coverage.  - C/w cefepime (tolerated keflex previously, low risk for cross-reactivity to penicillin) and vancomycin  - Cultures: NGTD
W/ penicillin allergy.    - Given foreign body, will need pseudomonal and MRSA coverage.  - C/w cefepime (tolerated keflex previously, low risk for cross-reactivity to penicillin) and vancomycin  - F/u cultures when avail.

## 2018-07-23 NOTE — PROGRESS NOTE ADULT - ATTENDING COMMENTS
48yoM w/ h/o hypothyroidism, ED s/p penile prosthesis c/b infxn s/p removal w/ debridement who tolerated procedure well on cef/vanc.  Tolerating po, no BM today.  AFVSS, exam per resident.  Labs stable.  Add miralax for bowel regimen otherwise stable.  Continue current care per primary team.
Patient seen and examined at bedside.     Feels well, concerned about elevated WBC count. Counseled on downtrending leukocytosis and alleviated some of his fear. Denies fevers or chills. Awaiting OR cultures, c/w Vanc/Cefepime for now and de-escalate when/if cultures grow. Dulcolax for constipation.     Please re-consult as needed.

## 2018-07-23 NOTE — PROGRESS NOTE ADULT - PROBLEM SELECTOR PLAN 1
S/p penile prosthesis revision/removal.  No history of cardiac disease, DM, or CVA.  Physically active.  Low risk procedure.  RCRI: Class I  METS: >4  - Tolerated procedure well  - Low risk for low risk procedure  - C/w levothyroxine S/p penile prosthesis revision/removal.  No history of cardiac disease, DM, or CVA.  Physically active.  Low risk procedure.  RCRI: Class I  METS: >4  - Tolerated procedure well  - Low risk for low risk procedure  - C/w levothyroxine    - Recommend aggressive bowel regimen - dulcolax supp, as patient has yet to have BM.

## 2018-07-23 NOTE — PROGRESS NOTE ADULT - SUBJECTIVE AND OBJECTIVE BOX
AM Note    No acute events overnight.      Vital Signs Last 24 Hrs  T(C): 36.9 (07-23-18 @ 05:56), Max: 37.1 (07-22-18 @ 16:21)  T(F): 98.4 (07-23-18 @ 05:56), Max: 98.8 (07-22-18 @ 16:21)  HR: 70 (07-23-18 @ 05:56) (65 - 85)  BP: 111/78 (07-23-18 @ 05:56) (102/68 - 115/77)  BP(mean): --  RR: 17 (07-23-18 @ 05:56) (16 - 17)  SpO2: 98% (07-23-18 @ 05:56) (97% - 99%)     22 Jul 2018 12:22    138    |  98     |  8      ----------------------------<  169    3.8     |  26     |  0.74     Ca    9.9        22 Jul 2018 12:22  Phos  3.8       22 Jul 2018 07:53  Mg     2.0       22 Jul 2018 07:53                            12.6   16.4  )-----------( 423      ( 22 Jul 2018 12:22 )             38.9         I&O's Summary    21 Jul 2018 07:01  -  22 Jul 2018 07:00  --------------------------------------------------------  IN: 3320 mL / OUT: 4400 mL / NET: -1080 mL    22 Jul 2018 07:01  -  23 Jul 2018 06:17  --------------------------------------------------------  IN: 2505 mL / OUT: 3900 mL / NET: -1395 mL          PHYSICAL EXAM:    GEN: NAD  ABD: soft, ntnd  : mckeon in place draining clear, penrose drains in place, dressings in place

## 2018-07-23 NOTE — PROGRESS NOTE ADULT - PROBLEM SELECTOR PLAN 4
Stable on 100mcg levothyroxine.  - C/w home dose

## 2018-07-23 NOTE — PROGRESS NOTE ADULT - PROBLEM SELECTOR PLAN 1
-cont abx  -mckeon, monitor uop  -pain meds prn  -Reg diet  -oob, amb  -ICDs, IS  -Cont present care

## 2018-07-23 NOTE — DIETITIAN INITIAL EVALUATION ADULT. - OTHER INFO
Pt is a 47 y/o male with hx of ED, IPP s/p penile prosthesis removal 7/20. Pt seen resting in bed. Currently on a regular diet and tolerating PO. Reports consuming 70-80% meals. Denies N/V. C/o constipation which makes him hesitant to eat more. Reports UBW is 180lbs. Current BW is 171lbs. Pt acknowledged wt loss and attributes it to having "2 surgeries in 1 month". Discused high protein, high fiber diet 2/2 unintentional wt loss and constipation. NKFA or dietary restrictions. Skin: surgical incision; GI WDL per flowsheet.

## 2018-07-24 LAB
ANION GAP SERPL CALC-SCNC: 14 MMOL/L — SIGNIFICANT CHANGE UP (ref 5–17)
BASOPHILS NFR BLD AUTO: 0.3 % — SIGNIFICANT CHANGE UP (ref 0–2)
BUN SERPL-MCNC: 13 MG/DL — SIGNIFICANT CHANGE UP (ref 7–23)
CALCIUM SERPL-MCNC: 9.7 MG/DL — SIGNIFICANT CHANGE UP (ref 8.4–10.5)
CHLORIDE SERPL-SCNC: 98 MMOL/L — SIGNIFICANT CHANGE UP (ref 96–108)
CO2 SERPL-SCNC: 27 MMOL/L — SIGNIFICANT CHANGE UP (ref 22–31)
CREAT SERPL-MCNC: 0.84 MG/DL — SIGNIFICANT CHANGE UP (ref 0.5–1.3)
EOSINOPHIL NFR BLD AUTO: 3.4 % — SIGNIFICANT CHANGE UP (ref 0–6)
GLUCOSE SERPL-MCNC: 109 MG/DL — HIGH (ref 70–99)
HCT VFR BLD CALC: 37.6 % — LOW (ref 39–50)
HGB BLD-MCNC: 12.2 G/DL — LOW (ref 13–17)
LYMPHOCYTES # BLD AUTO: 15.4 % — SIGNIFICANT CHANGE UP (ref 13–44)
MAGNESIUM SERPL-MCNC: 2 MG/DL — SIGNIFICANT CHANGE UP (ref 1.6–2.6)
MCHC RBC-ENTMCNC: 29 PG — SIGNIFICANT CHANGE UP (ref 27–34)
MCHC RBC-ENTMCNC: 32.4 G/DL — SIGNIFICANT CHANGE UP (ref 32–36)
MCV RBC AUTO: 89.3 FL — SIGNIFICANT CHANGE UP (ref 80–100)
MONOCYTES NFR BLD AUTO: 14.1 % — HIGH (ref 2–14)
NEUTROPHILS NFR BLD AUTO: 66.8 % — SIGNIFICANT CHANGE UP (ref 43–77)
PHOSPHATE SERPL-MCNC: 3.7 MG/DL — SIGNIFICANT CHANGE UP (ref 2.5–4.5)
PLATELET # BLD AUTO: 412 K/UL — HIGH (ref 150–400)
POTASSIUM SERPL-MCNC: 3.9 MMOL/L — SIGNIFICANT CHANGE UP (ref 3.5–5.3)
POTASSIUM SERPL-SCNC: 3.9 MMOL/L — SIGNIFICANT CHANGE UP (ref 3.5–5.3)
RBC # BLD: 4.21 M/UL — SIGNIFICANT CHANGE UP (ref 4.2–5.8)
RBC # FLD: 13.5 % — SIGNIFICANT CHANGE UP (ref 10.3–16.9)
SODIUM SERPL-SCNC: 139 MMOL/L — SIGNIFICANT CHANGE UP (ref 135–145)
VANCOMYCIN TROUGH SERPL-MCNC: 14.5 UG/ML — SIGNIFICANT CHANGE UP (ref 10–20)
WBC # BLD: 15 K/UL — HIGH (ref 3.8–10.5)
WBC # FLD AUTO: 15 K/UL — HIGH (ref 3.8–10.5)

## 2018-07-24 RX ORDER — CEFEPIME 1 G/1
1 INJECTION, POWDER, FOR SOLUTION INTRAMUSCULAR; INTRAVENOUS
Qty: 28 | Refills: 0 | OUTPATIENT
Start: 2018-07-24 | End: 2018-08-06

## 2018-07-24 RX ORDER — VANCOMYCIN HCL 1 G
1 VIAL (EA) INTRAVENOUS
Qty: 42 | Refills: 0 | OUTPATIENT
Start: 2018-07-24 | End: 2018-08-06

## 2018-07-24 RX ORDER — CEFEPIME 1 G/1
2 INJECTION, POWDER, FOR SOLUTION INTRAMUSCULAR; INTRAVENOUS
Qty: 56 | Refills: 0 | OUTPATIENT
Start: 2018-07-24 | End: 2018-08-06

## 2018-07-24 RX ADMIN — Medication 250 MILLIGRAM(S): at 21:55

## 2018-07-24 RX ADMIN — CEFEPIME 100 MILLIGRAM(S): 1 INJECTION, POWDER, FOR SOLUTION INTRAMUSCULAR; INTRAVENOUS at 22:00

## 2018-07-24 RX ADMIN — Medication 4 MILLIGRAM(S): at 10:06

## 2018-07-24 RX ADMIN — Medication 100 MICROGRAM(S): at 05:17

## 2018-07-24 RX ADMIN — Medication 250 MILLIGRAM(S): at 13:20

## 2018-07-24 RX ADMIN — Medication 4 MILLIGRAM(S): at 05:16

## 2018-07-24 RX ADMIN — Medication 4 MILLIGRAM(S): at 16:45

## 2018-07-24 RX ADMIN — Medication 250 MILLIGRAM(S): at 05:17

## 2018-07-24 RX ADMIN — SENNA PLUS 1 TABLET(S): 8.6 TABLET ORAL at 21:56

## 2018-07-24 RX ADMIN — CEFEPIME 100 MILLIGRAM(S): 1 INJECTION, POWDER, FOR SOLUTION INTRAMUSCULAR; INTRAVENOUS at 10:00

## 2018-07-24 NOTE — DISCHARGE NOTE ADULT - PLAN OF CARE
improvement after surgery regular diet, activity as tolerated, keep scrotal incisions clean, dry and intact. Keep PICC line area clean, dry and intact. Flush line with 10cc flush before and after antibiotic administration. Please get blood work done one week after discharge. Fax results to Dr Paredes's office. If fever >100.4 or any change or worsening of symptoms please call doctor or report to ED. Make followup appointment with Dr Paredes. regular diet, activity as tolerated, keep scrotal incisions clean, dry and intact. Where fitted undergarments to compress swelling. Keep PICC line area clean, dry and intact. You will need 2 weeks of both antibiotics. Flush line with 10cc flush before and after antibiotic administration. Please get blood work done one week after discharge, including a VANC TROUGH. A vanc trough helps to determine if the Vancomycin dose is efficient. Your being discharged with a Vanc Trough of 18. The ideal vanc trough for your condition is between 15 and 20. Fax results of all blood work to Dr Paredes's office. If fever >100.4 or any change or worsening of symptoms please call doctor or report to ED. Make followup appointment with Dr Paredes.

## 2018-07-24 NOTE — DISCHARGE NOTE ADULT - MEDICATION SUMMARY - MEDICATIONS TO TAKE
I will START or STAY ON the medications listed below when I get home from the hospital:    CBC (complete blood count)  -- Indication: For CBC    CMP (complete metabolic panel)  -- Indication: For CMP    sodium chloride 10cc flush  -- Indication: For flush    cefepime 2 g injection  -- 2 gram(s) intravenously every 12 hours     stop 8/6/2018  -- Indication: For Infected penile implant    vancomycin 1 g intravenous injection  -- 1 gram(s) intravenously every 8 hours     stop 8/6/2018  -- Indication: For Infected penile implant    Synthroid 100 mcg (0.1 mg) oral tablet  -- 1 tab(s) by mouth once a day  -- Indication: For Home med

## 2018-07-24 NOTE — DISCHARGE NOTE ADULT - CARE PLAN
Principal Discharge DX:	Infected penile implant  Goal:	improvement after surgery  Assessment and plan of treatment:	regular diet, activity as tolerated, keep scrotal incisions clean, dry and intact. Keep PICC line area clean, dry and intact. Flush line with 10cc flush before and after antibiotic administration. Please get blood work done one week after discharge. Fax results to Dr Paredes's office. If fever >100.4 or any change or worsening of symptoms please call doctor or report to ED. Make followup appointment with Dr Paredes. Principal Discharge DX:	Infected penile implant  Goal:	improvement after surgery  Assessment and plan of treatment:	regular diet, activity as tolerated, keep scrotal incisions clean, dry and intact. Where fitted undergarments to compress swelling. Keep PICC line area clean, dry and intact. You will need 2 weeks of both antibiotics. Flush line with 10cc flush before and after antibiotic administration. Please get blood work done one week after discharge, including a VANC TROUGH. A vanc trough helps to determine if the Vancomycin dose is efficient. Your being discharged with a Vanc Trough of 18. The ideal vanc trough for your condition is between 15 and 20. Fax results of all blood work to Dr Paredes's office. If fever >100.4 or any change or worsening of symptoms please call doctor or report to ED. Make followup appointment with Dr Paredes.

## 2018-07-24 NOTE — DISCHARGE NOTE ADULT - CARE PROVIDER_API CALL
Sulma Paredes), Urology  [address] [address]  Phone: [phone]  Fax: [phone]

## 2018-07-24 NOTE — DISCHARGE NOTE ADULT - BECAUSE OF A PHYSICAL, MENTAL OR EMOTIONAL CONDITION, DO YOU HAVE DIFFICULTY DOING  ERRANDS ALONE LIKE VISITING A DOCTOR'S OFFICE OR SHOPPING (15 YEARS AND OLDER)
The ultrasound of your gallbladder is normal. As we discussed, I will order the HIDA scan as a secondary evaluation of the function of your gallbladder.
No

## 2018-07-24 NOTE — PROGRESS NOTE ADULT - SUBJECTIVE AND OBJECTIVE BOX
AM Note    No acute events overnight.      Vital Signs Last 24 Hrs  T(C): 37.6 (07-23-18 @ 20:33), Max: 37.6 (07-23-18 @ 20:33)  T(F): 99.6 (07-23-18 @ 20:33), Max: 99.6 (07-23-18 @ 20:33)  HR: 87 (07-23-18 @ 20:33) (70 - 87)  BP: 107/71 (07-23-18 @ 20:33) (100/67 - 114/80)  BP(mean): --  RR: 16 (07-23-18 @ 20:33) (16 - 18)  SpO2: 98% (07-23-18 @ 20:33) (97% - 100%)     23 Jul 2018 06:22    140    |  98     |  10     ----------------------------<  104    4.3     |  31     |  0.86     Ca    10.7       23 Jul 2018 06:22  Phos  3.9       23 Jul 2018 06:22  Mg     2.1       23 Jul 2018 06:22                            12.9   15.9  )-----------( 407      ( 23 Jul 2018 06:22 )             40.2         I&O's Summary    22 Jul 2018 07:01  -  23 Jul 2018 07:00  --------------------------------------------------------  IN: 2505 mL / OUT: 3900 mL / NET: -1395 mL    23 Jul 2018 07:01  -  24 Jul 2018 04:52  --------------------------------------------------------  IN: 270 mL / OUT: 1150 mL / NET: -880 mL          PHYSICAL EXAM:    GEN: NAD  ABD: soft, ntnd  : linda in place draining clear, penrose in place

## 2018-07-24 NOTE — DISCHARGE NOTE ADULT - HOSPITAL COURSE
48M hx hypothyroidism, ED s/p IPP 6/29 came to [facility] with infected penile implant. Patient started on vancomycin and cefepime. Underwent penile implant removal 7/20. Tolerated procedure well. OR wound cultures negative. PICC line placed 7/25. Patient will follow with an infusion care company in [place]. Now AVSS, afebrile and hemodynamically stable.

## 2018-07-25 LAB
ANION GAP SERPL CALC-SCNC: 12 MMOL/L — SIGNIFICANT CHANGE UP (ref 5–17)
APPEARANCE UR: CLEAR — SIGNIFICANT CHANGE UP
BASOPHILS NFR BLD AUTO: 0.6 % — SIGNIFICANT CHANGE UP (ref 0–2)
BILIRUB UR-MCNC: NEGATIVE — SIGNIFICANT CHANGE UP
BUN SERPL-MCNC: 15 MG/DL — SIGNIFICANT CHANGE UP (ref 7–23)
CALCIUM SERPL-MCNC: 10.1 MG/DL — SIGNIFICANT CHANGE UP (ref 8.4–10.5)
CHLORIDE SERPL-SCNC: 99 MMOL/L — SIGNIFICANT CHANGE UP (ref 96–108)
CO2 SERPL-SCNC: 28 MMOL/L — SIGNIFICANT CHANGE UP (ref 22–31)
COLOR SPEC: YELLOW — SIGNIFICANT CHANGE UP
CREAT SERPL-MCNC: 0.79 MG/DL — SIGNIFICANT CHANGE UP (ref 0.5–1.3)
DIFF PNL FLD: NEGATIVE — SIGNIFICANT CHANGE UP
EOSINOPHIL NFR BLD AUTO: 4.5 % — SIGNIFICANT CHANGE UP (ref 0–6)
GLUCOSE SERPL-MCNC: 95 MG/DL — SIGNIFICANT CHANGE UP (ref 70–99)
GLUCOSE UR QL: NEGATIVE — SIGNIFICANT CHANGE UP
HCT VFR BLD CALC: 37.5 % — LOW (ref 39–50)
HGB BLD-MCNC: 12.2 G/DL — LOW (ref 13–17)
KETONES UR-MCNC: NEGATIVE — SIGNIFICANT CHANGE UP
LEUKOCYTE ESTERASE UR-ACNC: NEGATIVE — SIGNIFICANT CHANGE UP
LYMPHOCYTES # BLD AUTO: 18.2 % — SIGNIFICANT CHANGE UP (ref 13–44)
MAGNESIUM SERPL-MCNC: 2.4 MG/DL — SIGNIFICANT CHANGE UP (ref 1.6–2.6)
MCHC RBC-ENTMCNC: 29.3 PG — SIGNIFICANT CHANGE UP (ref 27–34)
MCHC RBC-ENTMCNC: 32.5 G/DL — SIGNIFICANT CHANGE UP (ref 32–36)
MCV RBC AUTO: 90.1 FL — SIGNIFICANT CHANGE UP (ref 80–100)
MONOCYTES NFR BLD AUTO: 13.2 % — SIGNIFICANT CHANGE UP (ref 2–14)
NEUTROPHILS NFR BLD AUTO: 63.5 % — SIGNIFICANT CHANGE UP (ref 43–77)
NITRITE UR-MCNC: NEGATIVE — SIGNIFICANT CHANGE UP
PH UR: 7 — SIGNIFICANT CHANGE UP (ref 5–8)
PHOSPHATE SERPL-MCNC: 3 MG/DL — SIGNIFICANT CHANGE UP (ref 2.5–4.5)
PLATELET # BLD AUTO: 410 K/UL — HIGH (ref 150–400)
POTASSIUM SERPL-MCNC: 3.8 MMOL/L — SIGNIFICANT CHANGE UP (ref 3.5–5.3)
POTASSIUM SERPL-SCNC: 3.8 MMOL/L — SIGNIFICANT CHANGE UP (ref 3.5–5.3)
PROT UR-MCNC: NEGATIVE MG/DL — SIGNIFICANT CHANGE UP
RBC # BLD: 4.16 M/UL — LOW (ref 4.2–5.8)
RBC # FLD: 13.5 % — SIGNIFICANT CHANGE UP (ref 10.3–16.9)
SODIUM SERPL-SCNC: 139 MMOL/L — SIGNIFICANT CHANGE UP (ref 135–145)
SP GR SPEC: 1.01 — SIGNIFICANT CHANGE UP (ref 1–1.03)
UROBILINOGEN FLD QL: 0.2 E.U./DL — SIGNIFICANT CHANGE UP
VANCOMYCIN TROUGH SERPL-MCNC: 15.6 UG/ML — SIGNIFICANT CHANGE UP (ref 10–20)
WBC # BLD: 13 K/UL — HIGH (ref 3.8–10.5)
WBC # FLD AUTO: 13 K/UL — HIGH (ref 3.8–10.5)

## 2018-07-25 PROCEDURE — 71046 X-RAY EXAM CHEST 2 VIEWS: CPT | Mod: 26

## 2018-07-25 PROCEDURE — 71045 X-RAY EXAM CHEST 1 VIEW: CPT | Mod: 26

## 2018-07-25 RX ORDER — POTASSIUM CHLORIDE 20 MEQ
20 PACKET (EA) ORAL ONCE
Qty: 0 | Refills: 0 | Status: COMPLETED | OUTPATIENT
Start: 2018-07-25 | End: 2018-07-25

## 2018-07-25 RX ADMIN — Medication 4 MILLIGRAM(S): at 13:38

## 2018-07-25 RX ADMIN — Medication 4 MILLIGRAM(S): at 00:34

## 2018-07-25 RX ADMIN — Medication 4 MILLIGRAM(S): at 23:36

## 2018-07-25 RX ADMIN — Medication 20 MILLIEQUIVALENT(S): at 10:55

## 2018-07-25 RX ADMIN — CEFEPIME 100 MILLIGRAM(S): 1 INJECTION, POWDER, FOR SOLUTION INTRAMUSCULAR; INTRAVENOUS at 09:53

## 2018-07-25 RX ADMIN — Medication 4 MILLIGRAM(S): at 09:52

## 2018-07-25 RX ADMIN — Medication 4 MILLIGRAM(S): at 17:59

## 2018-07-25 RX ADMIN — Medication 250 MILLIGRAM(S): at 22:01

## 2018-07-25 RX ADMIN — Medication 100 MICROGRAM(S): at 05:21

## 2018-07-25 RX ADMIN — Medication 250 MILLIGRAM(S): at 13:58

## 2018-07-25 RX ADMIN — Medication 250 MILLIGRAM(S): at 05:21

## 2018-07-25 RX ADMIN — CEFEPIME 100 MILLIGRAM(S): 1 INJECTION, POWDER, FOR SOLUTION INTRAMUSCULAR; INTRAVENOUS at 22:00

## 2018-07-25 NOTE — PROGRESS NOTE ADULT - SUBJECTIVE AND OBJECTIVE BOX
INTERVAL HPI/OVERNIGHT EVENTS:  No acute events overnight.    VITALS:    T(F): 98.5 (07-24-18 @ 20:10), Max: 99.3 (07-24-18 @ 08:38)  HR: 61 (07-24-18 @ 20:10) (61 - 82)  BP: 105/72 (07-24-18 @ 20:10) (105/72 - 110/77)  RR: 16 (07-24-18 @ 20:10) (16 - 17)  SpO2: 96% (07-24-18 @ 20:10) (96% - 100%)  Wt(kg): --    I&O's Detail    23 Jul 2018 07:01  -  24 Jul 2018 07:00  --------------------------------------------------------  IN:    Oral Fluid: 270 mL    Solution: 250 mL  Total IN: 520 mL    OUT:    Voided: 1150 mL  Total OUT: 1150 mL    Total NET: -630 mL      24 Jul 2018 07:01  -  25 Jul 2018 05:57  --------------------------------------------------------  IN:    Oral Fluid: 320 mL    Solution: 100 mL    Solution: 500 mL  Total IN: 920 mL    OUT:    Voided: 500 mL  Total OUT: 500 mL    Total NET: 420 mL          MEDICATIONS:    ANTIBIOTICS:  cefepime   IVPB 2000 milliGRAM(s) IV Intermittent every 12 hours  vancomycin  IVPB 1000 milliGRAM(s) IV Intermittent every 8 hours  vancomycin  IVPB          PAIN CONTROL:  acetaminophen   Tablet 650 milliGRAM(s) Oral every 6 hours PRN  acetaminophen   Tablet. 650 milliGRAM(s) Oral every 6 hours PRN  ondansetron Injectable 4 milliGRAM(s) IV Push every 6 hours PRN  oxyCODONE    5 mG/acetaminophen 325 mG 1 Tablet(s) Oral every 4 hours PRN  oxyCODONE    5 mG/acetaminophen 325 mG 2 Tablet(s) Oral every 6 hours PRN       MEDS:      HEME/ONC        PHYSICAL EXAM:  General: No acute distress.  Alert and Oriented  Abdominal Exam: soft nt/nd.    Exam: Penrose drain noted draining well.  Incision sites c/d/i. Patient able to void on own.        LABS:                        12.2   15.0  )-----------( 412      ( 24 Jul 2018 07:37 )             37.6     07-24    139  |  98  |  13  ----------------------------<  109<H>  3.9   |  27  |  0.84    Ca    9.7      24 Jul 2018 07:37  Phos  3.7     07-24  Mg     2.0     07-24            RADIOLOGY & ADDITIONAL TESTS:    ASSESSMENT: 48yMale w/ infected penile prosthesis s/p IPP removal (7/20).  Patient is afebrile, VSS.      PLAN:  -Diet: Reg  -Pain control  -Monitor Urine Output  -DVT ppx: SCD  -Cont Abx: Vanc, Cefepime  -OOB/IS

## 2018-07-26 LAB
ANION GAP SERPL CALC-SCNC: 11 MMOL/L — SIGNIFICANT CHANGE UP (ref 5–17)
BASOPHILS NFR BLD AUTO: 0.6 % — SIGNIFICANT CHANGE UP (ref 0–2)
BUN SERPL-MCNC: 12 MG/DL — SIGNIFICANT CHANGE UP (ref 7–23)
CALCIUM SERPL-MCNC: 10.3 MG/DL — SIGNIFICANT CHANGE UP (ref 8.4–10.5)
CHLORIDE SERPL-SCNC: 100 MMOL/L — SIGNIFICANT CHANGE UP (ref 96–108)
CO2 SERPL-SCNC: 30 MMOL/L — SIGNIFICANT CHANGE UP (ref 22–31)
CREAT SERPL-MCNC: 0.8 MG/DL — SIGNIFICANT CHANGE UP (ref 0.5–1.3)
EOSINOPHIL NFR BLD AUTO: 6.1 % — HIGH (ref 0–6)
GLUCOSE SERPL-MCNC: 113 MG/DL — HIGH (ref 70–99)
HCT VFR BLD CALC: 37.6 % — LOW (ref 39–50)
HGB BLD-MCNC: 12.3 G/DL — LOW (ref 13–17)
LYMPHOCYTES # BLD AUTO: 17.9 % — SIGNIFICANT CHANGE UP (ref 13–44)
MAGNESIUM SERPL-MCNC: 2.4 MG/DL — SIGNIFICANT CHANGE UP (ref 1.6–2.6)
MCHC RBC-ENTMCNC: 29.6 PG — SIGNIFICANT CHANGE UP (ref 27–34)
MCHC RBC-ENTMCNC: 32.7 G/DL — SIGNIFICANT CHANGE UP (ref 32–36)
MCV RBC AUTO: 90.4 FL — SIGNIFICANT CHANGE UP (ref 80–100)
MONOCYTES NFR BLD AUTO: 13.9 % — SIGNIFICANT CHANGE UP (ref 2–14)
NEUTROPHILS NFR BLD AUTO: 61.5 % — SIGNIFICANT CHANGE UP (ref 43–77)
PHOSPHATE SERPL-MCNC: 3 MG/DL — SIGNIFICANT CHANGE UP (ref 2.5–4.5)
PLATELET # BLD AUTO: 370 K/UL — SIGNIFICANT CHANGE UP (ref 150–400)
POTASSIUM SERPL-MCNC: 4 MMOL/L — SIGNIFICANT CHANGE UP (ref 3.5–5.3)
POTASSIUM SERPL-SCNC: 4 MMOL/L — SIGNIFICANT CHANGE UP (ref 3.5–5.3)
RBC # BLD: 4.16 M/UL — LOW (ref 4.2–5.8)
RBC # FLD: 13.4 % — SIGNIFICANT CHANGE UP (ref 10.3–16.9)
SODIUM SERPL-SCNC: 141 MMOL/L — SIGNIFICANT CHANGE UP (ref 135–145)
VANCOMYCIN TROUGH SERPL-MCNC: 18 UG/ML — SIGNIFICANT CHANGE UP (ref 10–20)
WBC # BLD: 10.8 K/UL — HIGH (ref 3.8–10.5)
WBC # FLD AUTO: 10.8 K/UL — HIGH (ref 3.8–10.5)

## 2018-07-26 PROCEDURE — 36569 INSJ PICC 5 YR+ W/O IMAGING: CPT

## 2018-07-26 PROCEDURE — 77001 FLUOROGUIDE FOR VEIN DEVICE: CPT | Mod: 26

## 2018-07-26 PROCEDURE — 76937 US GUIDE VASCULAR ACCESS: CPT | Mod: 26

## 2018-07-26 RX ORDER — CHLORHEXIDINE GLUCONATE 213 G/1000ML
1 SOLUTION TOPICAL DAILY
Qty: 0 | Refills: 0 | Status: DISCONTINUED | OUTPATIENT
Start: 2018-07-26 | End: 2018-07-27

## 2018-07-26 RX ADMIN — Medication 4 MILLIGRAM(S): at 10:17

## 2018-07-26 RX ADMIN — Medication 4 MILLIGRAM(S): at 23:27

## 2018-07-26 RX ADMIN — CHLORHEXIDINE GLUCONATE 1 APPLICATION(S): 213 SOLUTION TOPICAL at 23:29

## 2018-07-26 RX ADMIN — Medication 250 MILLIGRAM(S): at 21:53

## 2018-07-26 RX ADMIN — Medication 650 MILLIGRAM(S): at 23:10

## 2018-07-26 RX ADMIN — Medication 4 MILLIGRAM(S): at 14:49

## 2018-07-26 RX ADMIN — Medication 250 MILLIGRAM(S): at 05:47

## 2018-07-26 RX ADMIN — Medication 100 MICROGRAM(S): at 05:47

## 2018-07-26 RX ADMIN — SENNA PLUS 1 TABLET(S): 8.6 TABLET ORAL at 23:09

## 2018-07-26 RX ADMIN — CEFEPIME 100 MILLIGRAM(S): 1 INJECTION, POWDER, FOR SOLUTION INTRAMUSCULAR; INTRAVENOUS at 22:09

## 2018-07-26 RX ADMIN — CEFEPIME 100 MILLIGRAM(S): 1 INJECTION, POWDER, FOR SOLUTION INTRAMUSCULAR; INTRAVENOUS at 10:10

## 2018-07-26 RX ADMIN — Medication 650 MILLIGRAM(S): at 23:55

## 2018-07-26 RX ADMIN — Medication 250 MILLIGRAM(S): at 13:30

## 2018-07-26 NOTE — PROGRESS NOTE ADULT - SUBJECTIVE AND OBJECTIVE BOX
INTERVAL HPI/OVERNIGHT EVENTS:  No acute events overnight.    VITALS:    T(F): 98 (18 @ 05:42), Max: 101.1 (18 @ 14:09)  HR: 63 (18 @ 05:42) (60 - 83)  BP: 116/77 (18 @ 05:42) (105/70 - 118/78)  RR: 16 (18 @ 05:42) (15 - 16)  SpO2: 98% (18 @ 05:42) (97% - 100%)  Wt(kg): --    I&O's Detail    2018 07:  -  2018 07:00  --------------------------------------------------------  IN:    Oral Fluid: 320 mL    Solution: 100 mL    Solution: 500 mL  Total IN: 920 mL    OUT:    Voided: 500 mL  Total OUT: 500 mL    Total NET: 420 mL      2018 07:  -  2018 06:03  --------------------------------------------------------  IN:    Oral Fluid: 880 mL    Solution: 100 mL    Solution: 500 mL  Total IN: 1480 mL    OUT:    Voided: 1700 mL  Total OUT: 1700 mL    Total NET: -220 mL          MEDICATIONS:    ANTIBIOTICS:  cefepime   IVPB 2000 milliGRAM(s) IV Intermittent every 12 hours  vancomycin  IVPB 1000 milliGRAM(s) IV Intermittent every 8 hours  vancomycin  IVPB          PAIN CONTROL:  acetaminophen   Tablet 650 milliGRAM(s) Oral every 6 hours PRN  acetaminophen   Tablet. 650 milliGRAM(s) Oral every 6 hours PRN  ondansetron Injectable 4 milliGRAM(s) IV Push every 6 hours PRN  oxyCODONE    5 mG/acetaminophen 325 mG 1 Tablet(s) Oral every 4 hours PRN  oxyCODONE    5 mG/acetaminophen 325 mG 2 Tablet(s) Oral every 6 hours PRN       MEDS:      HEME/ONC        PHYSICAL EXAM:  General: No acute distress.  Alert and Oriented  Abdominal Exam: soft nt/nd.    Exam: Incision sites C/D/I.  Shaft of phallus non-tender.  No erythema, edema, or ecchymosis noted.  Penrose drain intact draining.       LABS:                        12.2   13.0  )-----------( 410      ( 2018 07:45 )             37.5     07-25    139  |  99  |  15  ----------------------------<  95  3.8   |  28  |  0.79    Ca    10.1      2018 07:45  Phos  3.0     -  Mg     2.4             Urinalysis Basic - ( 2018 16:03 )    Color: Yellow / Appearance: Clear / S.015 / pH: x  Gluc: x / Ketone: NEGATIVE  / Bili: Negative / Urobili: 0.2 E.U./dL   Blood: x / Protein: NEGATIVE mg/dL / Nitrite: NEGATIVE   Leuk Esterase: NEGATIVE / RBC: x / WBC x   Sq Epi: x / Non Sq Epi: x / Bacteria: x        RADIOLOGY & ADDITIONAL TESTS:    ASSESSMENT: 48yMale w/ infected Penile prosthesis s/p infected PP removal, stimulan injection (2018)     PLAN:  -Diet: Reg  -Pain control  -Monitor Urine Output  -DVT ppx:SCD  -Cont Abx: Vanc  -OOB/IS

## 2018-07-27 VITALS
OXYGEN SATURATION: 100 % | RESPIRATION RATE: 16 BRPM | DIASTOLIC BLOOD PRESSURE: 74 MMHG | SYSTOLIC BLOOD PRESSURE: 107 MMHG | HEART RATE: 68 BPM | TEMPERATURE: 98 F

## 2018-07-27 LAB
ANION GAP SERPL CALC-SCNC: 11 MMOL/L — SIGNIFICANT CHANGE UP (ref 5–17)
BASOPHILS NFR BLD AUTO: 0.9 % — SIGNIFICANT CHANGE UP (ref 0–2)
BUN SERPL-MCNC: 14 MG/DL — SIGNIFICANT CHANGE UP (ref 7–23)
CALCIUM SERPL-MCNC: 9.8 MG/DL — SIGNIFICANT CHANGE UP (ref 8.4–10.5)
CHLORIDE SERPL-SCNC: 100 MMOL/L — SIGNIFICANT CHANGE UP (ref 96–108)
CO2 SERPL-SCNC: 28 MMOL/L — SIGNIFICANT CHANGE UP (ref 22–31)
CREAT SERPL-MCNC: 0.81 MG/DL — SIGNIFICANT CHANGE UP (ref 0.5–1.3)
EOSINOPHIL NFR BLD AUTO: 7.8 % — HIGH (ref 0–6)
GLUCOSE SERPL-MCNC: 97 MG/DL — SIGNIFICANT CHANGE UP (ref 70–99)
HCT VFR BLD CALC: 35.8 % — LOW (ref 39–50)
HGB BLD-MCNC: 11.7 G/DL — LOW (ref 13–17)
LYMPHOCYTES # BLD AUTO: 18.3 % — SIGNIFICANT CHANGE UP (ref 13–44)
MAGNESIUM SERPL-MCNC: 2.2 MG/DL — SIGNIFICANT CHANGE UP (ref 1.6–2.6)
MCHC RBC-ENTMCNC: 29.3 PG — SIGNIFICANT CHANGE UP (ref 27–34)
MCHC RBC-ENTMCNC: 32.7 G/DL — SIGNIFICANT CHANGE UP (ref 32–36)
MCV RBC AUTO: 89.5 FL — SIGNIFICANT CHANGE UP (ref 80–100)
MONOCYTES NFR BLD AUTO: 16.3 % — HIGH (ref 2–14)
NEUTROPHILS NFR BLD AUTO: 56.7 % — SIGNIFICANT CHANGE UP (ref 43–77)
PHOSPHATE SERPL-MCNC: 3.2 MG/DL — SIGNIFICANT CHANGE UP (ref 2.5–4.5)
PLATELET # BLD AUTO: 350 K/UL — SIGNIFICANT CHANGE UP (ref 150–400)
POTASSIUM SERPL-MCNC: 3.9 MMOL/L — SIGNIFICANT CHANGE UP (ref 3.5–5.3)
POTASSIUM SERPL-SCNC: 3.9 MMOL/L — SIGNIFICANT CHANGE UP (ref 3.5–5.3)
RBC # BLD: 4 M/UL — LOW (ref 4.2–5.8)
RBC # FLD: 13.1 % — SIGNIFICANT CHANGE UP (ref 10.3–16.9)
SODIUM SERPL-SCNC: 139 MMOL/L — SIGNIFICANT CHANGE UP (ref 135–145)
SURGICAL PATHOLOGY STUDY: SIGNIFICANT CHANGE UP
WBC # BLD: 10.7 K/UL — HIGH (ref 3.8–10.5)
WBC # FLD AUTO: 10.7 K/UL — HIGH (ref 3.8–10.5)

## 2018-07-27 PROCEDURE — 77001 FLUOROGUIDE FOR VEIN DEVICE: CPT

## 2018-07-27 PROCEDURE — 99285 EMERGENCY DEPT VISIT HI MDM: CPT

## 2018-07-27 PROCEDURE — 80202 ASSAY OF VANCOMYCIN: CPT

## 2018-07-27 PROCEDURE — 80048 BASIC METABOLIC PNL TOTAL CA: CPT

## 2018-07-27 PROCEDURE — 85730 THROMBOPLASTIN TIME PARTIAL: CPT

## 2018-07-27 PROCEDURE — 87205 SMEAR GRAM STAIN: CPT

## 2018-07-27 PROCEDURE — 84100 ASSAY OF PHOSPHORUS: CPT

## 2018-07-27 PROCEDURE — 87075 CULTR BACTERIA EXCEPT BLOOD: CPT

## 2018-07-27 PROCEDURE — 36415 COLL VENOUS BLD VENIPUNCTURE: CPT

## 2018-07-27 PROCEDURE — 80053 COMPREHEN METABOLIC PANEL: CPT

## 2018-07-27 PROCEDURE — 81003 URINALYSIS AUTO W/O SCOPE: CPT

## 2018-07-27 PROCEDURE — 85025 COMPLETE CBC W/AUTO DIFF WBC: CPT

## 2018-07-27 PROCEDURE — 76937 US GUIDE VASCULAR ACCESS: CPT

## 2018-07-27 PROCEDURE — 87040 BLOOD CULTURE FOR BACTERIA: CPT

## 2018-07-27 PROCEDURE — C1751: CPT

## 2018-07-27 PROCEDURE — 85610 PROTHROMBIN TIME: CPT

## 2018-07-27 PROCEDURE — 71045 X-RAY EXAM CHEST 1 VIEW: CPT

## 2018-07-27 PROCEDURE — 88304 TISSUE EXAM BY PATHOLOGIST: CPT

## 2018-07-27 PROCEDURE — 36569 INSJ PICC 5 YR+ W/O IMAGING: CPT

## 2018-07-27 PROCEDURE — 87070 CULTURE OTHR SPECIMN AEROBIC: CPT

## 2018-07-27 PROCEDURE — 87086 URINE CULTURE/COLONY COUNT: CPT

## 2018-07-27 PROCEDURE — 83735 ASSAY OF MAGNESIUM: CPT

## 2018-07-27 RX ADMIN — Medication 100 MICROGRAM(S): at 06:25

## 2018-07-27 RX ADMIN — Medication 250 MILLIGRAM(S): at 06:25

## 2018-07-27 RX ADMIN — CEFEPIME 100 MILLIGRAM(S): 1 INJECTION, POWDER, FOR SOLUTION INTRAMUSCULAR; INTRAVENOUS at 09:53

## 2018-07-27 NOTE — PROGRESS NOTE ADULT - PROBLEM SELECTOR PLAN 1
-linda, monitor uop  -cont abx: Cefpime, Vanco  -diet reg  -pain meds prn  -oob, amb  -like d/c home today with PICC  -cont present care

## 2018-07-27 NOTE — PROGRESS NOTE ADULT - SUBJECTIVE AND OBJECTIVE BOX
AM Note    No acute events overnight.      Vital Signs Last 24 Hrs  T(C): 36.4 (07-27-18 @ 05:41), Max: 36.8 (07-26-18 @ 22:55)  T(F): 97.5 (07-27-18 @ 05:41), Max: 98.2 (07-26-18 @ 22:55)  HR: 60 (07-27-18 @ 05:41) (60 - 85)  BP: 105/70 (07-27-18 @ 05:41) (105/70 - 114/73)  BP(mean): --  RR: 17 (07-27-18 @ 05:41) (14 - 17)  SpO2: 98% (07-27-18 @ 05:41) (97% - 100%)     26 Jul 2018 07:38    141    |  100    |  12     ----------------------------<  113    4.0     |  30     |  0.80     Ca    10.3       26 Jul 2018 07:38  Phos  3.0       26 Jul 2018 07:38  Mg     2.4       26 Jul 2018 07:38                            12.3   10.8  )-----------( 370      ( 26 Jul 2018 07:38 )             37.6         I&O's Summary    25 Jul 2018 07:01  -  26 Jul 2018 07:00  --------------------------------------------------------  IN: 1730 mL / OUT: 1700 mL / NET: 30 mL    26 Jul 2018 07:01  -  27 Jul 2018 06:22  --------------------------------------------------------  IN: 1080 mL / OUT: 1900 mL / NET: -820 mL          PHYSICAL EXAM:    GEN: NAD  ABD: soft, ntnd  : linda in place draining clear

## 2018-07-27 NOTE — PROGRESS NOTE ADULT - ASSESSMENT
48M w/ hypothyroidism, ED s/p penile prosthesis presents w/ prosthesis infection, now s/p removal/revision.
Pt is a 49 y/o male with hx of ED s/p IPP insertion 6/29 presenting c/o scrotal swelling with pain x2-3days
48M w/ hypothyroidism, ED s/p penile prosthesis presents w/ prosthesis infection, now s/p removal/revision.
48M w/ hypothyroidism, ED s/p penile prosthesis presents w/ prosthesis infection, planned for removal/revision.
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
Pt is a 49 y/o male with hx ED s/p IPP 6/29 admitted for infected penile prosthesis s/p removal of penile prosthesis 7/20
Pt is a 49 y/o male with hx of ED, IPP s/p penile prosthesis removal 7/20
Pt is a 49 y/o male with hx of hypothyroid, ED, IPP s/p PP removal 7/20
Pt is a 48M s/p IPP removal and stimulan injection on 7/20, POD#1

## 2018-07-27 NOTE — PROGRESS NOTE ADULT - PROBLEM SELECTOR PROBLEM 1
Infected penile implant
Pre-op evaluation
Infected penile implant
Pre-op evaluation
Pre-op evaluation
Infected penile implant

## 2018-07-27 NOTE — PROGRESS NOTE ADULT - PROVIDER SPECIALTY LIST ADULT
Internal Medicine
Urology
Internal Medicine

## 2018-07-30 LAB
CULTURE RESULTS: SIGNIFICANT CHANGE UP
CULTURE RESULTS: SIGNIFICANT CHANGE UP
SPECIMEN SOURCE: SIGNIFICANT CHANGE UP
SPECIMEN SOURCE: SIGNIFICANT CHANGE UP

## 2018-08-10 DIAGNOSIS — T83.61XA INFECTION AND INFLAMMATORY REACTION DUE TO IMPLANTED PENILE PROSTHESIS, INITIAL ENCOUNTER: ICD-10-CM

## 2018-08-10 DIAGNOSIS — D72.829 ELEVATED WHITE BLOOD CELL COUNT, UNSPECIFIED: ICD-10-CM

## 2018-08-10 DIAGNOSIS — E03.9 HYPOTHYROIDISM, UNSPECIFIED: ICD-10-CM

## 2018-12-07 PROBLEM — E03.9 HYPOTHYROIDISM, UNSPECIFIED: Chronic | Status: ACTIVE | Noted: 2018-07-18

## 2019-01-09 NOTE — CHART NOTE - NSCHARTNOTEFT_GEN_A_CORE
Infectious Diseases Anti-infective Approval Note    Medication:  Cefepime  Dose:  2 grams  Route:  IV   Frequency:  q12  Duration:  7 days     Dose may be adjusted as needed for alterations in renal function.    *THIS IS NOT AN INFECTIOUS DISEASES CONSULTATION* [FreeTextEntry1] : GAIT: ambulates with crutches, NWB on affected extremity with good coordination and balance. Shows competency with crutching.\par GENERAL: alert, cooperative pleasant young 15 yo male in NAD\par SKIN: The skin is intact, warm, pink and dry over the area examined.\par EYES: Normal conjunctiva, normal eyelids and pupils were equal and round.\par ENT: normal ears, normal nose and normal lips.\par CARDIOVASCULAR: brisk capillary refill, but no peripheral edema.\par RESPIRATORY: The patient is in no apparent respiratory distress. They're taking full deep breaths without use of accessory muscles or evidence of audible wheezes or stridor without the use of a stethoscope. Normal respiratory effort.\par ABDOMEN: not examined  \par LLE: Large effusion and swelling left knee. Tender anterior tibial region. Skin intact\par No fx blisters. No calf tenderness\par Unable to SLR\par distal motor 5/5\par sensation grossly intact\par brisk cap refill\par \par \par \par

## 2019-05-01 NOTE — DISCHARGE NOTE ADULT - PATIENT PORTAL LINK FT
You can access the [facility], offered by [facility], by registering with the following website: [email or web address]
no

## 2022-05-25 NOTE — DIETITIAN INITIAL EVALUATION ADULT. - FACTORS AFF FOOD INTAKE
Called patient.  No answer, left VM for patient indicating requested medication was sent to pharmacy    Xavier Sosa MD persistent constipation
